# Patient Record
Sex: FEMALE | Race: WHITE | NOT HISPANIC OR LATINO | Employment: OTHER | RURAL
[De-identification: names, ages, dates, MRNs, and addresses within clinical notes are randomized per-mention and may not be internally consistent; named-entity substitution may affect disease eponyms.]

---

## 2017-09-14 ENCOUNTER — CONVERSION ENCOUNTER (OUTPATIENT)
Dept: FAMILY MEDICINE CLINIC | Facility: CLINIC | Age: 52
End: 2017-09-14

## 2017-09-14 ENCOUNTER — HOSPITAL ENCOUNTER (OUTPATIENT)
Dept: OTHER | Facility: HOSPITAL | Age: 52
Discharge: HOME | End: 2017-09-14
Attending: FAMILY MEDICINE | Admitting: FAMILY MEDICINE

## 2017-10-16 ENCOUNTER — CONVERSION ENCOUNTER (OUTPATIENT)
Dept: FAMILY MEDICINE CLINIC | Facility: CLINIC | Age: 52
End: 2017-10-16

## 2018-09-17 ENCOUNTER — CONVERSION ENCOUNTER (OUTPATIENT)
Dept: FAMILY MEDICINE CLINIC | Facility: CLINIC | Age: 53
End: 2018-09-17

## 2019-06-04 VITALS
RESPIRATION RATE: 16 BRPM | OXYGEN SATURATION: 93 % | BODY MASS INDEX: 37.28 KG/M2 | HEART RATE: 90 BPM | SYSTOLIC BLOOD PRESSURE: 107 MMHG | WEIGHT: 210 LBS | BODY MASS INDEX: 37.21 KG/M2 | RESPIRATION RATE: 16 BRPM | DIASTOLIC BLOOD PRESSURE: 72 MMHG | OXYGEN SATURATION: 96 % | HEART RATE: 86 BPM | SYSTOLIC BLOOD PRESSURE: 133 MMHG | DIASTOLIC BLOOD PRESSURE: 81 MMHG | WEIGHT: 210.38 LBS | HEIGHT: 63 IN | HEIGHT: 63 IN

## 2019-09-13 PROBLEM — E78.5 HYPERLIPIDEMIA: Status: ACTIVE | Noted: 2019-09-13

## 2019-09-13 PROBLEM — D72.829 ELEVATED WHITE BLOOD CELL COUNT: Status: ACTIVE | Noted: 2017-12-14

## 2019-09-13 PROBLEM — K58.9 IRRITABLE BOWEL SYNDROME: Status: ACTIVE | Noted: 2019-09-13

## 2019-09-13 PROBLEM — K21.9 GASTROESOPHAGEAL REFLUX DISEASE: Status: ACTIVE | Noted: 2019-09-13

## 2019-09-13 RX ORDER — HYDROCHLOROTHIAZIDE 25 MG/1
TABLET ORAL EVERY 24 HOURS
COMMUNITY
Start: 2018-08-10 | End: 2019-09-19 | Stop reason: SDUPTHER

## 2019-09-13 RX ORDER — SIMVASTATIN 20 MG
TABLET ORAL
COMMUNITY
Start: 2018-07-06 | End: 2019-09-19 | Stop reason: SDUPTHER

## 2019-09-13 RX ORDER — OLANZAPINE 10 MG/1
TABLET ORAL
COMMUNITY
Start: 2014-08-22

## 2019-09-13 RX ORDER — PROPRANOLOL HYDROCHLORIDE 20 MG/1
TABLET ORAL
COMMUNITY
Start: 2014-08-22

## 2019-09-19 ENCOUNTER — OFFICE VISIT (OUTPATIENT)
Dept: FAMILY MEDICINE CLINIC | Facility: CLINIC | Age: 54
End: 2019-09-19

## 2019-09-19 VITALS
HEIGHT: 63 IN | DIASTOLIC BLOOD PRESSURE: 76 MMHG | SYSTOLIC BLOOD PRESSURE: 117 MMHG | OXYGEN SATURATION: 94 % | RESPIRATION RATE: 18 BRPM | WEIGHT: 189.8 LBS | HEART RATE: 90 BPM | TEMPERATURE: 97.6 F | BODY MASS INDEX: 33.63 KG/M2

## 2019-09-19 DIAGNOSIS — K21.9 GASTROESOPHAGEAL REFLUX DISEASE WITHOUT ESOPHAGITIS: ICD-10-CM

## 2019-09-19 DIAGNOSIS — F20.0 PARANOID SCHIZOPHRENIA (HCC): ICD-10-CM

## 2019-09-19 DIAGNOSIS — Z00.00 ENCOUNTER FOR MEDICARE ANNUAL WELLNESS EXAM: ICD-10-CM

## 2019-09-19 DIAGNOSIS — R60.0 LOCALIZED EDEMA: ICD-10-CM

## 2019-09-19 DIAGNOSIS — K52.9 COLITIS: ICD-10-CM

## 2019-09-19 DIAGNOSIS — J43.2 CENTRILOBULAR EMPHYSEMA (HCC): ICD-10-CM

## 2019-09-19 DIAGNOSIS — Z23 NEED FOR IMMUNIZATION AGAINST INFLUENZA: ICD-10-CM

## 2019-09-19 DIAGNOSIS — E78.01 FAMILIAL HYPERCHOLESTEROLEMIA: Primary | ICD-10-CM

## 2019-09-19 DIAGNOSIS — N94.6 DYSMENORRHEA: ICD-10-CM

## 2019-09-19 DIAGNOSIS — K58.2 IRRITABLE BOWEL SYNDROME WITH BOTH CONSTIPATION AND DIARRHEA: ICD-10-CM

## 2019-09-19 PROCEDURE — 90674 CCIIV4 VAC NO PRSV 0.5 ML IM: CPT | Performed by: FAMILY MEDICINE

## 2019-09-19 PROCEDURE — G0439 PPPS, SUBSEQ VISIT: HCPCS | Performed by: FAMILY MEDICINE

## 2019-09-19 PROCEDURE — G0008 ADMIN INFLUENZA VIRUS VAC: HCPCS | Performed by: FAMILY MEDICINE

## 2019-09-19 PROCEDURE — 99406 BEHAV CHNG SMOKING 3-10 MIN: CPT | Performed by: FAMILY MEDICINE

## 2019-09-19 PROCEDURE — 99214 OFFICE O/P EST MOD 30 MIN: CPT | Performed by: FAMILY MEDICINE

## 2019-09-19 RX ORDER — HYDROCHLOROTHIAZIDE 25 MG/1
25 TABLET ORAL EVERY 24 HOURS
Qty: 90 TABLET | Refills: 3 | Status: SHIPPED | OUTPATIENT
Start: 2019-09-19 | End: 2020-09-09

## 2019-09-19 RX ORDER — SIMVASTATIN 20 MG
20 TABLET ORAL NIGHTLY
Qty: 90 TABLET | Refills: 3 | Status: SHIPPED | OUTPATIENT
Start: 2019-09-19 | End: 2020-09-09

## 2019-09-19 NOTE — PROGRESS NOTES
Judy Sosa presents for Annual Wellness Visit as well as stable chronic medical conditions as listed.    HPI  Past Medical History:   Diagnosis Date   • Amenorrhea    • Colitis    • COPD (chronic obstructive pulmonary disease) (CMS/HCC)    • Dysmenorrhea    • Edema    • Elevated white blood cell count    • GERD (gastroesophageal reflux disease)    • Hyperlipidemia    • IBS (irritable bowel syndrome)    • Palpitation    • Paranoid schizophrenia (CMS/HCC)      Past Surgical History:   Procedure Laterality Date   • APPENDECTOMY       Family History   Problem Relation Age of Onset   • Lung cancer Father    • Diabetes Father    • Diabetes Paternal Grandfather      Social History     Tobacco Use   • Smoking status: Current Every Day Smoker   • Smokeless tobacco: Never Used   Substance Use Topics   • Alcohol use: No     Frequency: Never       No visits with results within 7 Day(s) from this visit.   Latest known visit with results is:   Conversion Encounter on 09/09/2016   Component Date Value Ref Range Status   • Albumin 09/09/2016 4.4  3.6 - 5.1 g/dL Final   • Alkaline Phosphatase 09/09/2016 94  33 - 130 units/L Final   • Basophils Absolute 09/09/2016 36 CELLS/UL  0 - 200 10*3/mm3 Final   • Basophil Rel % 09/09/2016 0.3  % Final   • Total Bilirubin 09/09/2016 0.5  0.2 - 1.2 mg/dL Final   • BUN 09/09/2016 11  7 - 25 mg/dL Final   • BUN/Creatinine Ratio 09/09/2016 NOT APPLICABLE (calc)  6 - 22 Final   • Calcium 09/09/2016 9.3  8.6 - 10.4 mg/dL Final   • Chloride 09/09/2016 106  98 - 110 mmol/L Final   • Chol/HDL Ratio 09/09/2016 3.2 (calc)  < OR = 5.0 Final   • Total Cholesterol 09/09/2016 120* 125 - 200 mg/dL Final   • CO2 CONTENT  09/09/2016 25  20 - 31 mmol/L Final   • Creatinine 09/09/2016 0.87  0.50 - 1.05 mg/dL Final   • eGFR African Am 09/09/2016 77  > OR = 60 mL/min/1.73m2 Final   • eGFR Non  Am 09/09/2016 89  > OR = 60 mL/min/1.73m2 Final   • Eosinophils Absolute 09/09/2016 252 CELLS/UL  15 - 500  10*3/mm3 Final   • Eosinophil Rel % 09/09/2016 2.1  % Final   • Globulin 09/09/2016 2.8 G/DL (CALC)  1.9 - 3.7 g/dL Final   • Glucose 09/09/2016 89  65 - 99 mg/dL Final   • Hematocrit 09/09/2016 49.9* 35.0 - 45.0 % Final   • HDL Cholesterol 09/09/2016 37* > OR = 46 mg/dL Final   • Hemoglobin 09/09/2016 16.4* 11.7 - 15.5 g/dL Final   • LDL Cholesterol  09/09/2016 64 MG/DL (CALC)  <130 mg/dL Final   • Lymphocytes Absolute 09/09/2016 3300 CELLS/UL  850 - 3900 10*3/mm3 Final   • Lymphocyte Rel % 09/09/2016 27.5  % Final   • MCH 09/09/2016 31.7  27.0 - 33.0 pg Final   • MCHC 09/09/2016 32.9 G/DL  32.0 - 36.0 % Final   • MCV 09/09/2016 96.5  80.0 - 100.0 fL Final   • Monocyte Rel % 09/09/2016 4.6  % Final   • Monocytes Absolute 09/09/2016 552 CELLS/UL  200 - 950 10*3/microliter Final   • MPV 09/09/2016 7.3* 7.5 - 11.5 fL Final   • Neutrophils Absolute 09/09/2016 7860 CELLS/UL* 1500 - 7800 10*3/mm3 Final   • Platelets 09/09/2016 327 THOUSAND/UL  140 - 400 10*3/mm3 Final   • Neutrophils, Fluid 09/09/2016 65.5  % Final   • Potassium 09/09/2016 3.5  3.5 - 5.3 mmol/L Final   • Total Protein 09/09/2016 7.2  6.1 - 8.1 g/dL Final   • RBC 09/09/2016 5.17 MILLION/UL* 3.80 - 5.10 10*6/mm3 Final   • RDW 09/09/2016 14.1  11.0 - 15.0 % Final   • AST (SGOT) 09/09/2016 19  10 - 35 units/L Final   • ALT (SGPT) 09/09/2016 24  6 - 29 units/L Final   • Sodium 09/09/2016 141  135 - 146 mmol/L Final   • Triglycerides 09/09/2016 94  <150 mg/dL Final   • WBC 09/09/2016 12.0 THOUSAND/UL* 3.8 - 10.8 K/uL Final   • A/G Ratio 09/09/2016 1.6 (calc)  1.0 - 2.5 Final   • Albumin 09/15/2017 4.1  3.6 - 5.1 g/dL Final   • Alkaline Phosphatase 09/15/2017 88  33 - 130 units/L Final   • Basophils Absolute 09/15/2017 35 CELLS/UL  0 - 200 10*3/mm3 Final   • Basophil Rel % 09/15/2017 0.3  % Final   • Total Bilirubin 09/15/2017 0.4  0.2 - 1.2 mg/dL Final   • BUN 09/15/2017 11  7 - 25 mg/dL Final   • BUN/Creatinine Ratio 09/15/2017 NOT APPLICABLE (calc)  6 - 22  Final   • Calcium 09/15/2017 9.0  8.6 - 10.4 mg/dL Final   • Chloride 09/15/2017 106  98 - 110 mmol/L Final   • Chol/HDL Ratio 09/15/2017 3.9 (calc)  <5.0 Final   • Total Cholesterol 09/15/2017 126  <200 mg/dL Final   • CO2 CONTENT  09/15/2017 25  20 - 31 mmol/L Final   • Creatinine 09/15/2017 0.75  0.50 - 1.05 mg/dL Final   • eGFR African Am 09/15/2017 91  > OR = 60 mL/min/1.73m2 Final   • eGFR Non African Am 09/15/2017 105  > OR = 60 mL/min/1.73m2 Final   • Eosinophils Absolute 09/15/2017 255 CELLS/UL  15 - 500 10*3/mm3 Final   • Eosinophil Rel % 09/15/2017 2.2  % Final   • Globulin 09/15/2017 2.5 G/DL (CALC)  1.9 - 3.7 g/dL Final   • Glucose 09/15/2017 84  65 - 99 mg/dL Final   • Hematocrit 09/15/2017 43.8  35.0 - 45.0 % Final   • HDL Cholesterol 09/15/2017 32* >50 mg/dL Final   • Hemoglobin 09/15/2017 15.1  11.7 - 15.5 g/dL Final   • LDL Cholesterol  09/15/2017 71 MG/DL (CALC)  mg/dL Final   • Lymphocytes Absolute 09/15/2017 3399 CELLS/UL  850 - 3900 10*3/mm3 Final   • Lymphocyte Rel % 09/15/2017 29.3  % Final   • MCH 09/15/2017 31.9  27.0 - 33.0 pg Final   • MCHC 09/15/2017 34.5 G/DL  32.0 - 36.0 % Final   • MCV 09/15/2017 92.4  80.0 - 100.0 fL Final   • Monocyte Rel % 09/15/2017 5.6  % Final   • Monocytes Absolute 09/15/2017 650 CELLS/UL  200 - 950 10*3/microliter Final   • MPV 09/15/2017 9.0  7.5 - 12.5 fL Final   • Neutrophils Absolute 09/15/2017 7262 CELLS/UL  1500 - 7800 10*3/mm3 Final   • Platelets 09/15/2017 312 THOUSAND/UL  140 - 400 10*3/mm3 Final   • Neutrophils, Fluid 09/15/2017 62.6  % Final   • Potassium 09/15/2017 3.6  3.5 - 5.3 mmol/L Final   • Total Protein 09/15/2017 6.6  6.1 - 8.1 g/dL Final   • RBC 09/15/2017 4.74 MILLION/UL  3.80 - 5.10 10*6/mm3 Final   • RDW 09/15/2017 12.7  11.0 - 15.0 % Final   • AST (SGOT) 09/15/2017 16  10 - 35 units/L Final   • ALT (SGPT) 09/15/2017 20  6 - 29 units/L Final   • Sodium 09/15/2017 142  135 - 146 mmol/L Final   • Triglycerides 09/15/2017 146  <150 mg/dL  Final   • WBC 09/15/2017 11.6 THOUSAND/UL* 3.8 - 10.8 K/uL Final   • A/G Ratio 09/15/2017 1.6 (calc)  1.0 - 2.5 Final   • Albumin 09/17/2018 4.2  3.6 - 5.1 g/dL Final   • Alkaline Phosphatase 09/17/2018 100  33 - 130 units/L Final   • Basophils Absolute 09/17/2018 57 CELLS/UL  0 - 200 10*3/mm3 Final   • Basophil Rel % 09/17/2018 0.4  % Final   • Total Bilirubin 09/17/2018 0.4  0.2 - 1.2 mg/dL Final   • BUN 09/17/2018 9  7 - 25 mg/dL Final   • BUN/Creatinine Ratio 09/17/2018 NOT APPLICABLE (calc)  6 - 22 Final   • Calcium 09/17/2018 9.1  8.6 - 10.4 mg/dL Final   • Chloride 09/17/2018 107  98 - 110 mmol/L Final   • Chol/HDL Ratio 09/17/2018 3.4 (calc)  <5.0 Final   • Total Cholesterol 09/17/2018 130  <200 mg/dL Final   • CO2 CONTENT  09/17/2018 27  20 - 32 mmol/L Final   • Creatinine 09/17/2018 0.79  0.50 - 1.05 mg/dL Final   • eGFR African Am 09/17/2018 85  > OR = 60 mL/min/1.73m2 Final   • eGFR Non  Am 09/17/2018 98  > OR = 60 mL/min/1.73m2 Final   • Eosinophils Absolute 09/17/2018 286 CELLS/UL  15 - 500 10*3/mm3 Final   • Eosinophil Rel % 09/17/2018 2.0  % Final   • Globulin 09/17/2018 3.0 G/DL (CALC)  1.9 - 3.7 g/dL Final   • Glucose 09/17/2018 92  65 - 99 mg/dL Final   • Hematocrit 09/17/2018 46.5* 35.0 - 45.0 % Final   • HDL Cholesterol 09/17/2018 38* >50 mg/dL Final   • Hemoglobin 09/17/2018 15.5  11.7 - 15.5 g/dL Final   • LDL Cholesterol  09/17/2018 71 MG/DL (CALC)  mg/dL Final   • Lymphocytes Absolute 09/17/2018 3461 CELLS/UL  850 - 3900 10*3/mm3 Final   • Lymphocyte Rel % 09/17/2018 24.2  % Final   • MCH 09/17/2018 30.8  27.0 - 33.0 pg Final   • MCHC 09/17/2018 33.3 G/DL  32.0 - 36.0 % Final   • MCV 09/17/2018 92.4  80.0 - 100.0 fL Final   • Monocyte Rel % 09/17/2018 4.0  % Final   • Monocytes Absolute 09/17/2018 572 CELLS/UL  200 - 950 10*3/microliter Final   • MPV 09/17/2018 9.1  7.5 - 12.5 fL Final   • Neutrophils Absolute 09/17/2018 9924 CELLS/UL* 1500 - 7800 10*3/mm3 Final   • Platelets  09/17/2018 316 THOUSAND/UL  140 - 400 10*3/mm3 Final   • Neutrophils, Fluid 09/17/2018 69.4  % Final   • Potassium 09/17/2018 3.4* 3.5 - 5.3 mmol/L Final   • Total Protein 09/17/2018 7.2  6.1 - 8.1 g/dL Final   • RBC 09/17/2018 5.03 MILLION/UL  3.80 - 5.10 10*6/mm3 Final   • RDW 09/17/2018 13.0  11.0 - 15.0 % Final   • AST (SGOT) 09/17/2018 16  10 - 35 units/L Final   • ALT (SGPT) 09/17/2018 20  6 - 29 units/L Final   • Sodium 09/17/2018 142  135 - 146 mmol/L Final   • Triglycerides 09/17/2018 127  <150 mg/dL Final   • WBC 09/17/2018 14.3 THOUSAND/UL* 3.8 - 10.8 K/uL Final   • A/G Ratio 09/17/2018 1.4 (calc)  1.0 - 2.5 Final   • Hematocrit 12/20/2018 45.7* 35.0 - 45.0 % Final   • Hemoglobin 12/20/2018 15.1  11.7 - 15.5 g/dL Final   • MCH 12/20/2018 30.8  27.0 - 33.0 pg Final   • MCHC 12/20/2018 33.0 G/DL  32.0 - 36.0 % Final   • MCV 12/20/2018 93.3  80.0 - 100.0 fL Final   • MPV 12/20/2018 9.2  7.5 - 12.5 fL Final   • Platelets 12/20/2018 335 THOUSAND/UL  140 - 400 10*3/mm3 Final   • RBC 12/20/2018 4.90 MILLION/UL  3.80 - 5.10 10*6/mm3 Final   • RDW 12/20/2018 12.9  11.0 - 15.0 % Final   • WBC 12/20/2018 12.4 THOUSAND/UL* 3.8 - 10.8 K/uL Final         Diagnoses and all orders for this visit:    1. Familial hypercholesterolemia (Primary)  Assessment & Plan:  Recheck blood work and notify of results.  Encouraged low-fat, low-cholesterol diet. Discussed timing of lipid monitoring, need for liver monitoring while on meds. Risks of lack of control discussed.     Orders:  -     simvastatin (ZOCOR) 20 MG tablet; Take 1 tablet by mouth Every Night.  Dispense: 90 tablet; Refill: 3  -     Comprehensive Metabolic Panel  -     Lipid Panel    2. Encounter for Medicare annual wellness exam  Assessment & Plan:  Discussed injury prevention, diet and exercise, safe sexual practices, and screening for common diseases. Encouraged use of sunscreen and seatbelts. Encouraged SBE, avoidance of tobacco, limiting alcohol, and yearly dental  and eye exams.  She declines mammogram, PAP, HPV testing.       3. Centrilobular emphysema (CMS/HCC)  Assessment & Plan:  Minimal symptoms. Continues to smoke. Judy Sosa is a current cigarettes user.  She currently smokes 2 pack of cigarettes and packs of cigarettes per day for a duration of 40 years. I have educated her on the risk of diseases from using tobacco products such as cancer, COPD and heart diease.     I advised her to quit and she is not willing to quit.    I spent 4 minutes counseling the patient.                4. Irritable bowel syndrome with both constipation and diarrhea  Assessment & Plan:  Stable with occasional flares with dietary indiscretions.       5. Gastroesophageal reflux disease without esophagitis  Assessment & Plan:  Limit tobacco, alcohol, caffeine, chocalate, citrus fruits, recumbency after meals and large portions.       6. Colitis    7. Dysmenorrhea    8. Paranoid schizophrenia (CMS/HCC)  Assessment & Plan:  Continue psychiatry management.       9. Localized edema  -     hydrochlorothiazide (HYDRODIURIL) 25 MG tablet; Take 1 tablet by mouth Daily.  Dispense: 90 tablet; Refill: 3  -     CBC Auto Differential    10. Need for immunization against influenza  -     Cancel: Flucelvax Quad=>4Years (Vial); Standing  -     Cancel: Flucelvax Quad=>4Years (Vial)  -     Flucelvax Quad=>4Years (PFS)        Chief Complaint   Patient presents with   • Medicare Wellness-subsequent       Subjective   History of Present Illness:  Judy Sosa is a 54 y.o. female who presents for a Subsequent Medicare Wellness Visit.    HEALTH RISK ASSESSMENT    Recent Hospitalizations:  No hospitalization(s) within the last year.    Current Medical Providers:  Patient Care Team:  Lyell, Reggie Duane, MD as PCP - General  Franck Haq as PCP - Claims Attributed    Smoking Status:  Social History     Tobacco Use   Smoking Status Current Every Day Smoker   Smokeless Tobacco Never Used       Alcohol  Consumption:  Social History     Substance and Sexual Activity   Alcohol Use No   • Frequency: Never       Depression Screen:   PHQ-2/PHQ-9 Depression Screening 9/19/2019   Little interest or pleasure in doing things 1   Feeling down, depressed, or hopeless 0   Total Score 1       Fall Risk Screen:  ALFREDO Fall Risk Assessment has not been completed.    Health Habits and Functional and Cognitive Screening:  Functional & Cognitive Status 9/19/2019   Do you have difficulty preparing food and eating? No   Do you have difficulty bathing yourself, getting dressed or grooming yourself? No   Do you have difficulty using the toilet? No   Do you have difficulty moving around from place to place? No   Do you have trouble with steps or getting out of a bed or a chair? No   Current Diet Well Balanced Diet   Dental Exam Up to date   Eye Exam Up to date   Exercise (times per week) 0 times per week   Current Exercise Activities Include None   Do you need help using the phone?  No   Are you deaf or do you have serious difficulty hearing?  No   Do you need help with transportation? No   Do you need help shopping? No   Do you need help preparing meals?  No   Do you need help with housework?  No   Do you need help with laundry? No   Do you need help taking your medications? No   Do you need help managing money? No   Do you ever drive or ride in a car without wearing a seat belt? No   Have you felt unusual stress, anger or loneliness in the last month? No   Who do you live with? Alone   If you need help, do you have trouble finding someone available to you? No   Have you been bothered in the last four weeks by sexual problems? No   Do you have difficulty concentrating, remembering or making decisions? No         Does the patient have evidence of cognitive impairment? No    Asprin use counseling:Does not need ASA (and currently is not on it)    Age-appropriate Screening Schedule:  Refer to the list below for future screening  recommendations based on patient's age, sex and/or medical conditions. Orders for these recommended tests are listed in the plan section. The patient has been provided with a written plan.    Health Maintenance   Topic Date Due   • TDAP/TD VACCINES (1 - Tdap) 08/15/1983   • ZOSTER VACCINE (1 of 2) 08/15/2014   • INFLUENZA VACCINE  08/01/2019   • PAP SMEAR  09/13/2019   • COLONOSCOPY  09/13/2019   • MAMMOGRAM  09/14/2019   • LIPID PANEL  09/17/2019          The following portions of the patient's history were reviewed and updated as appropriate: allergies, current medications, past family history, past medical history, past social history, past surgical history and problem list.    Outpatient Medications Prior to Visit   Medication Sig Dispense Refill   • OLANZapine (ZYPREXA) 10 MG tablet ZYPREXA 10 MG TABS     • propranolol (INDERAL) 20 MG tablet PROPRANOLOL HCL 20 MG TABS     • topiramate (TOPAMAX) 200 MG tablet TOPAMAX 200 MG TABS     • hydrochlorothiazide (HYDRODIURIL) 25 MG tablet Daily.     • simvastatin (ZOCOR) 20 MG tablet SIMVASTATIN 20 MG TABS       No facility-administered medications prior to visit.        Patient Active Problem List   Diagnosis   • Amenorrhea   • Chronic obstructive pulmonary disease (CMS/HCC)   • Colitis   • Dysmenorrhea   • Edema   • Elevated white blood cell count   • Gastroesophageal reflux disease   • Hyperlipidemia   • Irritable bowel syndrome   • Paranoid schizophrenia (CMS/HCC)   • Encounter for Medicare annual wellness exam       Advanced Care Planning:  Patient does not have an advance directive - information provided to the patient today    Review of Systems    Compared to one year ago, the patient feels her physical health is the same.  Compared to one year ago, the patient feels her mental health is the same.    Reviewed chart for potential of high risk medication in the elderly: yes  Reviewed chart for potential of harmful drug interactions in the elderly:yes    Objective   "       Vitals:    09/19/19 1256   BP: 117/76   BP Location: Left arm   Cuff Size: Large Adult   Pulse: 90   Resp: 18   Temp: 97.6 °F (36.4 °C)   TempSrc: Oral   SpO2: 94%   Weight: 86.1 kg (189 lb 12.8 oz)   Height: 160 cm (63\")       Body mass index is 33.62 kg/m².  Discussed the patient's BMI with her. The BMI is above average; BMI management plan is completed.    Physical Exam          Assessment/Plan   Medicare Risks and Personalized Health Plan  CMS Preventative Services Quick Reference  Advance Directive Discussion  Obesity/Overweight     The above risks/problems have been discussed with the patient.  Pertinent information has been shared with the patient in the After Visit Summary.  Follow up plans and orders are seen below in the Assessment/Plan Section.    Diagnoses and all orders for this visit:    1. Familial hypercholesterolemia (Primary)  Assessment & Plan:  Recheck blood work and notify of results.  Encouraged low-fat, low-cholesterol diet. Discussed timing of lipid monitoring, need for liver monitoring while on meds. Risks of lack of control discussed.     Orders:  -     simvastatin (ZOCOR) 20 MG tablet; Take 1 tablet by mouth Every Night.  Dispense: 90 tablet; Refill: 3  -     Comprehensive Metabolic Panel  -     Lipid Panel    2. Centrilobular emphysema (CMS/HCC)  Assessment & Plan:  Minimal symptoms. Continues to smoke. Judy Sosa is a current cigarettes user.  She currently smokes 2 pack of cigarettes and packs of cigarettes per day for a duration of 40 years. I have educated her on the risk of diseases from using tobacco products such as cancer, COPD and heart diease.     I advised her to quit and she is not willing to quit.    I spent 4 minutes counseling the patient.                3. Irritable bowel syndrome with both constipation and diarrhea  Assessment & Plan:  Stable with occasional flares with dietary indiscretions.       4. Gastroesophageal reflux disease without " esophagitis  Assessment & Plan:  Limit tobacco, alcohol, caffeine, chocalate, citrus fruits, recumbency after meals and large portions.       5. Colitis    6. Dysmenorrhea    7. Paranoid schizophrenia (CMS/[facility])  Assessment & Plan:  Continue psychiatry management.       8. Localized edema  -     hydrochlorothiazide (HYDRODIURIL) 25 MG tablet; Take 1 tablet by mouth Daily.  Dispense: 90 tablet; Refill: 3  -     CBC Auto Differential    9. Need for immunization against influenza  -     Flucelvax Quad=>4Years (Vial); Standing  -     Flucelvax Quad=>4Years (Vial)    Follow Up:  Return in about 1 year (around 9/19/2020).     An After Visit Summary and PPPS were given to the patient.

## 2019-09-19 NOTE — ASSESSMENT & PLAN NOTE
Recheck blood work and notify of results.  Encouraged low-fat, low-cholesterol diet. Discussed timing of lipid monitoring, need for liver monitoring while on meds. Risks of lack of control discussed.

## 2019-09-19 NOTE — ASSESSMENT & PLAN NOTE
Minimal symptoms. Continues to smoke. Judy Sosa is a current cigarettes user.  She currently smokes 2 pack of cigarettes and packs of cigarettes per day for a duration of 40 years. I have educated her on the risk of diseases from using tobacco products such as cancer, COPD and heart diease.     I advised her to quit and she is not willing to quit.    I spent 4 minutes counseling the patient.

## 2019-09-19 NOTE — ASSESSMENT & PLAN NOTE
Discussed injury prevention, diet and exercise, safe sexual practices, and screening for common diseases. Encouraged use of sunscreen and seatbelts. Encouraged SBE, avoidance of tobacco, limiting alcohol, and yearly dental and eye exams.  She declines mammogram, PAP, HPV testing.

## 2019-09-19 NOTE — ASSESSMENT & PLAN NOTE
Limit tobacco, alcohol, caffeine, chocalate, citrus fruits, recumbency after meals and large portions.

## 2019-09-20 LAB
ALBUMIN SERPL-MCNC: 4.5 G/DL (ref 3.5–5.5)
ALBUMIN/GLOB SERPL: 1.6 {RATIO} (ref 1.2–2.2)
ALP SERPL-CCNC: 109 IU/L (ref 39–117)
ALT SERPL-CCNC: 28 IU/L (ref 0–32)
AST SERPL-CCNC: 21 IU/L (ref 0–40)
BASOPHILS # BLD AUTO: 0 X10E3/UL (ref 0–0.2)
BASOPHILS NFR BLD AUTO: 0 %
BILIRUB SERPL-MCNC: 0.3 MG/DL (ref 0–1.2)
BUN SERPL-MCNC: 8 MG/DL (ref 6–24)
BUN/CREAT SERPL: 9 (ref 9–23)
CALCIUM SERPL-MCNC: 8.7 MG/DL (ref 8.7–10.2)
CHLORIDE SERPL-SCNC: 99 MMOL/L (ref 96–106)
CHOLEST SERPL-MCNC: 135 MG/DL (ref 100–199)
CO2 SERPL-SCNC: 22 MMOL/L (ref 20–29)
CREAT SERPL-MCNC: 0.93 MG/DL (ref 0.57–1)
EOSINOPHIL # BLD AUTO: 0.3 X10E3/UL (ref 0–0.4)
EOSINOPHIL NFR BLD AUTO: 2 %
ERYTHROCYTE [DISTWIDTH] IN BLOOD BY AUTOMATED COUNT: 13.6 % (ref 12.3–15.4)
GLOBULIN SER CALC-MCNC: 2.8 G/DL (ref 1.5–4.5)
GLUCOSE SERPL-MCNC: 117 MG/DL (ref 65–99)
HCT VFR BLD AUTO: 46.8 % (ref 34–46.6)
HDLC SERPL-MCNC: 29 MG/DL
HGB BLD-MCNC: 16.2 G/DL (ref 11.1–15.9)
IMM GRANULOCYTES # BLD AUTO: 0 X10E3/UL (ref 0–0.1)
IMM GRANULOCYTES NFR BLD AUTO: 0 %
LDLC SERPL CALC-MCNC: 69 MG/DL (ref 0–99)
LYMPHOCYTES # BLD AUTO: 3.5 X10E3/UL (ref 0.7–3.1)
LYMPHOCYTES NFR BLD AUTO: 28 %
MCH RBC QN AUTO: 31.8 PG (ref 26.6–33)
MCHC RBC AUTO-ENTMCNC: 34.6 G/DL (ref 31.5–35.7)
MCV RBC AUTO: 92 FL (ref 79–97)
MONOCYTES # BLD AUTO: 0.6 X10E3/UL (ref 0.1–0.9)
MONOCYTES NFR BLD AUTO: 5 %
NEUTROPHILS # BLD AUTO: 7.9 X10E3/UL (ref 1.4–7)
NEUTROPHILS NFR BLD AUTO: 65 %
PLATELET # BLD AUTO: 295 X10E3/UL (ref 150–450)
POTASSIUM SERPL-SCNC: 2.8 MMOL/L (ref 3.5–5.2)
PROT SERPL-MCNC: 7.3 G/DL (ref 6–8.5)
RBC # BLD AUTO: 5.09 X10E6/UL (ref 3.77–5.28)
SODIUM SERPL-SCNC: 141 MMOL/L (ref 134–144)
TRIGL SERPL-MCNC: 184 MG/DL (ref 0–149)
VLDLC SERPL CALC-MCNC: 37 MG/DL (ref 5–40)
WBC # BLD AUTO: 12.3 X10E3/UL (ref 3.4–10.8)

## 2019-09-24 ENCOUNTER — TELEPHONE (OUTPATIENT)
Dept: FAMILY MEDICINE CLINIC | Facility: CLINIC | Age: 54
End: 2019-09-24

## 2019-09-24 RX ORDER — POTASSIUM CHLORIDE 1500 MG/1
20 TABLET, FILM COATED, EXTENDED RELEASE ORAL DAILY
Qty: 90 TABLET | Refills: 2 | Status: SHIPPED | OUTPATIENT
Start: 2019-09-24 | End: 2019-10-28 | Stop reason: SDUPTHER

## 2019-09-24 RX ORDER — POTASSIUM CHLORIDE 1500 MG/1
20 TABLET, FILM COATED, EXTENDED RELEASE ORAL DAILY
COMMUNITY
End: 2019-09-24 | Stop reason: SDUPTHER

## 2019-10-23 ENCOUNTER — TELEPHONE (OUTPATIENT)
Dept: FAMILY MEDICINE CLINIC | Facility: CLINIC | Age: 54
End: 2019-10-23

## 2019-10-23 DIAGNOSIS — E87.6 HYPOKALEMIA: Primary | ICD-10-CM

## 2019-10-23 NOTE — TELEPHONE ENCOUNTER
----- Message from Tasha Reyes LPN sent at 9/24/2019 11:42 AM EDT -----  Repeat cmp for low potassium

## 2019-10-26 LAB
ALBUMIN SERPL-MCNC: 4.4 G/DL (ref 3.5–5.5)
ALBUMIN/GLOB SERPL: 1.5 {RATIO} (ref 1.2–2.2)
ALP SERPL-CCNC: 110 IU/L (ref 39–117)
ALT SERPL-CCNC: 24 IU/L (ref 0–32)
AST SERPL-CCNC: 22 IU/L (ref 0–40)
BILIRUB SERPL-MCNC: 0.3 MG/DL (ref 0–1.2)
BUN SERPL-MCNC: 10 MG/DL (ref 6–24)
BUN/CREAT SERPL: 12 (ref 9–23)
CALCIUM SERPL-MCNC: 8.8 MG/DL (ref 8.7–10.2)
CHLORIDE SERPL-SCNC: 97 MMOL/L (ref 96–106)
CO2 SERPL-SCNC: 25 MMOL/L (ref 20–29)
CREAT SERPL-MCNC: 0.86 MG/DL (ref 0.57–1)
GLOBULIN SER CALC-MCNC: 2.9 G/DL (ref 1.5–4.5)
GLUCOSE SERPL-MCNC: 122 MG/DL (ref 65–99)
POTASSIUM SERPL-SCNC: 3.3 MMOL/L (ref 3.5–5.2)
PROT SERPL-MCNC: 7.3 G/DL (ref 6–8.5)
SODIUM SERPL-SCNC: 141 MMOL/L (ref 134–144)

## 2019-10-28 ENCOUNTER — RESULTS ENCOUNTER (OUTPATIENT)
Dept: FAMILY MEDICINE CLINIC | Facility: CLINIC | Age: 54
End: 2019-10-28

## 2019-10-28 DIAGNOSIS — E87.6 HYPOKALEMIA: ICD-10-CM

## 2019-10-28 RX ORDER — POTASSIUM CHLORIDE 1500 MG/1
20 TABLET, FILM COATED, EXTENDED RELEASE ORAL DAILY
COMMUNITY
End: 2019-10-28 | Stop reason: SDUPTHER

## 2019-10-29 RX ORDER — POTASSIUM CHLORIDE 1500 MG/1
20 TABLET, FILM COATED, EXTENDED RELEASE ORAL 2 TIMES DAILY
Qty: 180 TABLET | Refills: 0 | Status: SHIPPED | OUTPATIENT
Start: 2019-10-29 | End: 2020-01-27

## 2019-12-11 ENCOUNTER — TELEPHONE (OUTPATIENT)
Dept: FAMILY MEDICINE CLINIC | Facility: CLINIC | Age: 54
End: 2019-12-11

## 2019-12-11 DIAGNOSIS — E87.6 LOW SERUM POTASSIUM: Primary | ICD-10-CM

## 2019-12-11 NOTE — TELEPHONE ENCOUNTER
----- Message from Tasha Reyes LPN sent at 10/28/2019  9:41 AM EDT -----  Repeat cmp for low potassium

## 2019-12-13 LAB
ALBUMIN SERPL-MCNC: 4.5 G/DL (ref 3.5–5.5)
ALBUMIN/GLOB SERPL: 1.5 {RATIO} (ref 1.2–2.2)
ALP SERPL-CCNC: 113 IU/L (ref 39–117)
ALT SERPL-CCNC: 22 IU/L (ref 0–32)
AST SERPL-CCNC: 16 IU/L (ref 0–40)
BILIRUB SERPL-MCNC: 0.2 MG/DL (ref 0–1.2)
BUN SERPL-MCNC: 15 MG/DL (ref 6–24)
BUN/CREAT SERPL: 16 (ref 9–23)
CALCIUM SERPL-MCNC: 9.1 MG/DL (ref 8.7–10.2)
CHLORIDE SERPL-SCNC: 106 MMOL/L (ref 96–106)
CO2 SERPL-SCNC: 24 MMOL/L (ref 20–29)
CREAT SERPL-MCNC: 0.93 MG/DL (ref 0.57–1)
GLOBULIN SER CALC-MCNC: 3 G/DL (ref 1.5–4.5)
GLUCOSE SERPL-MCNC: 109 MG/DL (ref 65–99)
POTASSIUM SERPL-SCNC: 4 MMOL/L (ref 3.5–5.2)
PROT SERPL-MCNC: 7.5 G/DL (ref 6–8.5)
SODIUM SERPL-SCNC: 146 MMOL/L (ref 134–144)

## 2020-09-09 DIAGNOSIS — E78.01 FAMILIAL HYPERCHOLESTEROLEMIA: ICD-10-CM

## 2020-09-09 DIAGNOSIS — R60.0 LOCALIZED EDEMA: ICD-10-CM

## 2020-09-09 RX ORDER — HYDROCHLOROTHIAZIDE 25 MG/1
TABLET ORAL
Qty: 90 TABLET | Refills: 0 | Status: SHIPPED | OUTPATIENT
Start: 2020-09-09 | End: 2020-09-21 | Stop reason: SDUPTHER

## 2020-09-09 RX ORDER — SIMVASTATIN 20 MG
TABLET ORAL
Qty: 90 TABLET | Refills: 3 | Status: SHIPPED | OUTPATIENT
Start: 2020-09-09 | End: 2020-09-21 | Stop reason: SDUPTHER

## 2020-09-21 ENCOUNTER — OFFICE VISIT (OUTPATIENT)
Dept: FAMILY MEDICINE CLINIC | Facility: CLINIC | Age: 55
End: 2020-09-21

## 2020-09-21 VITALS
HEART RATE: 81 BPM | OXYGEN SATURATION: 95 % | HEIGHT: 63 IN | DIASTOLIC BLOOD PRESSURE: 80 MMHG | RESPIRATION RATE: 16 BRPM | BODY MASS INDEX: 34.55 KG/M2 | WEIGHT: 195 LBS | TEMPERATURE: 97.4 F | SYSTOLIC BLOOD PRESSURE: 128 MMHG

## 2020-09-21 DIAGNOSIS — N94.6 DYSMENORRHEA: ICD-10-CM

## 2020-09-21 DIAGNOSIS — E78.01 FAMILIAL HYPERCHOLESTEROLEMIA: ICD-10-CM

## 2020-09-21 DIAGNOSIS — K58.2 IRRITABLE BOWEL SYNDROME WITH BOTH CONSTIPATION AND DIARRHEA: ICD-10-CM

## 2020-09-21 DIAGNOSIS — F20.0 PARANOID SCHIZOPHRENIA (HCC): ICD-10-CM

## 2020-09-21 DIAGNOSIS — Z00.01 ENCOUNTER FOR GENERAL ADULT MEDICAL EXAMINATION WITH ABNORMAL FINDINGS: Primary | ICD-10-CM

## 2020-09-21 DIAGNOSIS — R60.0 LOCALIZED EDEMA: ICD-10-CM

## 2020-09-21 DIAGNOSIS — D72.820 LYMPHOCYTOSIS: ICD-10-CM

## 2020-09-21 DIAGNOSIS — E78.2 MIXED HYPERLIPIDEMIA: ICD-10-CM

## 2020-09-21 DIAGNOSIS — J43.2 CENTRILOBULAR EMPHYSEMA (HCC): ICD-10-CM

## 2020-09-21 DIAGNOSIS — Z23 NEED FOR IMMUNIZATION AGAINST INFLUENZA: ICD-10-CM

## 2020-09-21 DIAGNOSIS — K21.9 GASTROESOPHAGEAL REFLUX DISEASE WITHOUT ESOPHAGITIS: ICD-10-CM

## 2020-09-21 PROCEDURE — 90686 IIV4 VACC NO PRSV 0.5 ML IM: CPT | Performed by: FAMILY MEDICINE

## 2020-09-21 PROCEDURE — 90471 IMMUNIZATION ADMIN: CPT | Performed by: FAMILY MEDICINE

## 2020-09-21 PROCEDURE — G0439 PPPS, SUBSEQ VISIT: HCPCS | Performed by: FAMILY MEDICINE

## 2020-09-21 RX ORDER — SIMVASTATIN 20 MG
20 TABLET ORAL NIGHTLY
Qty: 90 TABLET | Refills: 3 | Status: SHIPPED | OUTPATIENT
Start: 2020-09-21 | End: 2021-10-21 | Stop reason: SDUPTHER

## 2020-09-21 RX ORDER — HYDROCHLOROTHIAZIDE 25 MG/1
25 TABLET ORAL DAILY
Qty: 90 TABLET | Refills: 3 | Status: SHIPPED | OUTPATIENT
Start: 2020-09-21 | End: 2021-10-21 | Stop reason: SDUPTHER

## 2020-09-21 NOTE — ASSESSMENT & PLAN NOTE
She has significant lower extremity edema and she stopped taking her thiazide diuretic.  We will recheck blood work and monitor renal function.

## 2020-09-21 NOTE — ASSESSMENT & PLAN NOTE
Better with dietary control   Liver/Psoas biopsy Abdominal abcess drainage ERCP/EUS Reversal of ileostomy Right hemicolectomy

## 2020-09-21 NOTE — ASSESSMENT & PLAN NOTE
Discussed injury prevention, diet and exercise, safe sexual practices, and screening for common diseases. Encouraged use of sunscreen and seatbelts. Encouraged SBE, avoidance of tobacco, limiting alcohol, and yearly dental and eye exams.  She declines mammogram again this year.  She declines colon cancer screening.  Patient will be due for Pap smear and HPV testing next year.

## 2020-09-21 NOTE — PROGRESS NOTES
Chief Complaint   Patient presents with   • Medicare Wellness-subsequent   • Hyperlipidemia       Subjective   History of Present Illness:  Judy Sosa is a 55 y.o. female who presents for a Subsequent Medicare Wellness Visit.    HEALTH RISK ASSESSMENT    Recent Hospitalizations:  No hospitalization(s) within the last year.    Current Medical Providers:  Patient Care Team:  Lyell, Reggie Duane, MD as PCP - General  Franck Haq as PCP - Claims Attributed    Smoking Status:  Social History     Tobacco Use   Smoking Status Current Every Day Smoker   • Packs/day: 1.00   • Types: Cigarettes   Smokeless Tobacco Never Used       Alcohol Consumption:  Social History     Substance and Sexual Activity   Alcohol Use No   • Frequency: Never       Depression Screen:   PHQ-2/PHQ-9 Depression Screening 9/21/2020   Little interest or pleasure in doing things 0   Feeling down, depressed, or hopeless 0   Trouble falling or staying asleep, or sleeping too much 0   Feeling tired or having little energy 0   Poor appetite or overeating 0   Feeling bad about yourself - or that you are a failure or have let yourself or your family down 0   Trouble concentrating on things, such as reading the newspaper or watching television 0   Moving or speaking so slowly that other people could have noticed. Or the opposite - being so fidgety or restless that you have been moving around a lot more than usual 0   Thoughts that you would be better off dead, or of hurting yourself in some way 0   Total Score 0   If you checked off any problems, how difficult have these problems made it for you to do your work, take care of things at home, or get along with other people? Not difficult at all       Fall Risk Screen:  STEADI Fall Risk Assessment was completed, and patient is at LOW risk for falls.Assessment completed on:9/21/2020    Health Habits and Functional and Cognitive Screening:  Functional & Cognitive Status 9/21/2020   Do you have  difficulty preparing food and eating? No   Do you have difficulty bathing yourself, getting dressed or grooming yourself? No   Do you have difficulty using the toilet? No   Do you have difficulty moving around from place to place? No   Do you have trouble with steps or getting out of a bed or a chair? No   Current Diet Well Balanced Diet   Dental Exam Not up to date   Eye Exam Not up to date   Exercise (times per week) 0 times per week   Current Exercise Activities Include None   Do you need help using the phone?  No   Are you deaf or do you have serious difficulty hearing?  No   Do you need help with transportation? No   Do you need help shopping? No   Do you need help preparing meals?  No   Do you need help with housework?  No   Do you need help with laundry? No   Do you need help taking your medications? No   Do you need help managing money? No   Do you ever drive or ride in a car without wearing a seat belt? No   Have you felt unusual stress, anger or loneliness in the last month? No   Who do you live with? Alone   If you need help, do you have trouble finding someone available to you? No   Have you been bothered in the last four weeks by sexual problems? No   Do you have difficulty concentrating, remembering or making decisions? No         Does the patient have evidence of cognitive impairment? Yes    Asprin use counseling:Does not need ASA (and currently is not on it)    Age-appropriate Screening Schedule:  Refer to the list below for future screening recommendations based on patient's age, sex and/or medical conditions. Orders for these recommended tests are listed in the plan section. The patient has been provided with a written plan.    Health Maintenance   Topic Date Due   • PNEUMOCOCCAL VACCINE (19-64 MEDIUM RISK) (1 of 1 - PPSV23) 08/15/1983   • TDAP/TD VACCINES (1 - Tdap) 08/15/1983   • ZOSTER VACCINE (1 of 2) 08/15/2014   • PAP SMEAR  09/13/2019   • COLONOSCOPY  09/13/2019   • MAMMOGRAM  09/14/2019   •  INFLUENZA VACCINE  08/01/2020   • LIPID PANEL  09/19/2020          The following portions of the patient's history were reviewed and updated as appropriate: allergies, current medications, past family history, past medical history, past social history, past surgical history and problem list.    Outpatient Medications Prior to Visit   Medication Sig Dispense Refill   • Bioflavonoid Products (Vitamin C Plus) 1000 MG tablet Take 1 tablet by mouth Daily.     • OLANZapine (ZYPREXA) 10 MG tablet ZYPREXA 10 MG TABS     • propranolol (INDERAL) 20 MG tablet PROPRANOLOL HCL 20 MG TABS     • topiramate (TOPAMAX) 200 MG tablet TOPAMAX 200 MG TABS     • hydroCHLOROthiazide (HYDRODIURIL) 25 MG tablet TAKE ONE TABLET BY MOUTH ONCE DAILY 90 tablet 0   • simvastatin (ZOCOR) 20 MG tablet TAKE ONE TABLET BY MOUTH EVERY NIGHT FOR CHOLESTEROL 90 tablet 3     No facility-administered medications prior to visit.        Patient Active Problem List   Diagnosis   • Amenorrhea   • Chronic obstructive pulmonary disease (CMS/HCC)   • Colitis   • Dysmenorrhea   • Edema   • Elevated white blood cell count   • Gastroesophageal reflux disease   • Hyperlipidemia   • Irritable bowel syndrome   • Paranoid schizophrenia (CMS/[facility])   • Encounter for Medicare annual wellness exam       Advanced Care Planning:  ACP discussion was held with the patient during this visit. Patient does not have an advance directive, information provided.    Review of Systems   Constitutional: Negative for activity change, appetite change, chills, fatigue, fever and unexpected weight change.   HENT: Negative for congestion, ear discharge, ear pain, facial swelling, hearing loss, nosebleeds, postnasal drip, rhinorrhea, sinus pressure, sinus pain, sneezing, sore throat, tinnitus, trouble swallowing and voice change.    Eyes: Negative for photophobia, pain, discharge, redness, itching and visual disturbance.   Respiratory: Negative for apnea, cough, choking, chest tightness,  "shortness of breath and wheezing.    Cardiovascular: Negative for chest pain and palpitations.   Gastrointestinal: Negative for abdominal distention, abdominal pain, blood in stool, constipation, diarrhea, nausea and vomiting.   Endocrine: Negative for cold intolerance, heat intolerance, polydipsia and polyphagia.   Genitourinary: Negative for difficulty urinating, dysuria, enuresis, flank pain, frequency, hematuria, pelvic pain and urgency.   Musculoskeletal: Negative for arthralgias, back pain, gait problem, joint swelling, myalgias, neck pain and neck stiffness.   Skin: Negative for pallor, rash and wound.   Allergic/Immunologic: Negative for environmental allergies and food allergies.   Neurological: Negative for dizziness, tremors, seizures, syncope, speech difficulty, weakness, light-headedness, numbness and headaches.   Hematological: Negative for adenopathy. Does not bruise/bleed easily.   Psychiatric/Behavioral: Negative for confusion, decreased concentration, hallucinations and sleep disturbance. The patient is not nervous/anxious.        Compared to one year ago, the patient feels her physical health is the same.  Compared to one year ago, the patient feels her mental health is the same.    Reviewed chart for potential of high risk medication in the elderly: yes  Reviewed chart for potential of harmful drug interactions in the elderly:yes    Objective         Vitals:    09/21/20 1258   BP: 128/80   BP Location: Left arm   Patient Position: Sitting   Cuff Size: Adult   Pulse: 81   Resp: 16   Temp: 97.4 °F (36.3 °C)   TempSrc: Infrared   SpO2: 95%   Weight: 88.5 kg (195 lb)   Height: 160 cm (63\")   PainSc: 0-No pain       Body mass index is 34.54 kg/m².  Discussed the patient's BMI with her. The BMI is below average; BMI management plan is completed.    Physical Exam  Constitutional:       Appearance: She is well-developed.   HENT:      Head: Normocephalic and atraumatic.   Eyes:      General: No scleral " icterus.        Right eye: No discharge.         Left eye: No discharge.      Conjunctiva/sclera: Conjunctivae normal.      Pupils: Pupils are equal, round, and reactive to light.   Neck:      Thyroid: No thyromegaly.   Cardiovascular:      Rate and Rhythm: Normal rate and regular rhythm.      Heart sounds: Normal heart sounds. No murmur. No friction rub. No gallop.    Pulmonary:      Effort: Pulmonary effort is normal. No respiratory distress.      Breath sounds: Normal breath sounds. No wheezing or rales.   Chest:      Chest wall: No mass, deformity, swelling or tenderness.      Breasts: Breasts are symmetrical.         Right: No mass, nipple discharge, skin change or tenderness.         Left: No mass, nipple discharge, skin change or tenderness.   Abdominal:      General: Bowel sounds are normal. There is no distension.      Palpations: Abdomen is soft. There is no mass.      Tenderness: There is no abdominal tenderness. There is no guarding or rebound.   Musculoskeletal: Normal range of motion.         General: No tenderness or deformity.   Lymphadenopathy:      Cervical: No cervical adenopathy.   Skin:     General: Skin is warm and dry.      Findings: No erythema or rash.   Neurological:      Mental Status: She is alert and oriented to person, place, and time.      Cranial Nerves: No cranial nerve deficit.      Sensory: No sensory deficit.      Motor: No abnormal muscle tone.      Coordination: Coordination normal.      Deep Tendon Reflexes: Reflexes normal.   Psychiatric:         Behavior: Behavior normal.         Thought Content: Thought content normal.         Judgment: Judgment normal.               Assessment/Plan   Medicare Risks and Personalized Health Plan  CMS Preventative Services Quick Reference  Advance Directive Discussion  Dementia/Memory   Fall Risk  Obesity/Overweight     The above risks/problems have been discussed with the patient.  Pertinent information has been shared with the patient in the  After Visit Summary.  Follow up plans and orders are seen below in the Assessment/Plan Section.    Diagnoses and all orders for this visit:    1. Encounter for general adult medical examination with abnormal findings (Primary)    2. Mixed hyperlipidemia  Assessment & Plan:  Recheck blood work and call with results    Orders:  -     Comprehensive Metabolic Panel  -     Lipid Panel    3. Centrilobular emphysema (CMS/HCC)  Assessment & Plan:  Encouraged tobacco cessation.  Patient interested.        4. Gastroesophageal reflux disease without esophagitis  Assessment & Plan:  Stable.  Currently off of proton pump inhibitor but using as needed usage.      5. Irritable bowel syndrome with both constipation and diarrhea  Assessment & Plan:  Better with dietary control      6. Dysmenorrhea  Assessment & Plan:  Stable at present.      7. Lymphocytosis  Assessment & Plan:  Repeat CBC and call with results.    Orders:  -     CBC & Differential    8. Localized edema  Assessment & Plan:  She has significant lower extremity edema and she stopped taking her thiazide diuretic.  We will recheck blood work and monitor renal function.    Orders:  -     hydroCHLOROthiazide (HYDRODIURIL) 25 MG tablet; Take 1 tablet by mouth Daily.  Dispense: 90 tablet; Refill: 3    9. Paranoid schizophrenia (CMS/HCC)  Assessment & Plan:  Continue follow-up with psychiatry.      10. Need for immunization against influenza  -     Fluarix/Fluzone/Afluria Quad>6 Months    11. Familial hypercholesterolemia  Assessment & Plan:  Recheck blood work and call with results    Orders:  -     simvastatin (ZOCOR) 20 MG tablet; Take 1 tablet by mouth Every Night.  Dispense: 90 tablet; Refill: 3    Follow Up:  No follow-ups on file.     An After Visit Summary and PPPS were given to the patient.

## 2020-09-22 LAB
ALBUMIN SERPL-MCNC: 4.4 G/DL (ref 3.8–4.9)
ALBUMIN/GLOB SERPL: 1.6 {RATIO} (ref 1.2–2.2)
ALP SERPL-CCNC: 110 IU/L (ref 39–117)
ALT SERPL-CCNC: 28 IU/L (ref 0–32)
AST SERPL-CCNC: 20 IU/L (ref 0–40)
BASOPHILS # BLD AUTO: 0.1 X10E3/UL (ref 0–0.2)
BASOPHILS NFR BLD AUTO: 1 %
BILIRUB SERPL-MCNC: 0.3 MG/DL (ref 0–1.2)
BUN SERPL-MCNC: 9 MG/DL (ref 6–24)
BUN/CREAT SERPL: 10 (ref 9–23)
CALCIUM SERPL-MCNC: 8.7 MG/DL (ref 8.7–10.2)
CHLORIDE SERPL-SCNC: 103 MMOL/L (ref 96–106)
CHOLEST SERPL-MCNC: 139 MG/DL (ref 100–199)
CO2 SERPL-SCNC: 27 MMOL/L (ref 20–29)
CREAT SERPL-MCNC: 0.86 MG/DL (ref 0.57–1)
EOSINOPHIL # BLD AUTO: 0.3 X10E3/UL (ref 0–0.4)
EOSINOPHIL NFR BLD AUTO: 2 %
ERYTHROCYTE [DISTWIDTH] IN BLOOD BY AUTOMATED COUNT: 12.7 % (ref 11.7–15.4)
GLOBULIN SER CALC-MCNC: 2.7 G/DL (ref 1.5–4.5)
GLUCOSE SERPL-MCNC: 82 MG/DL (ref 65–99)
HCT VFR BLD AUTO: 47.3 % (ref 34–46.6)
HDLC SERPL-MCNC: 38 MG/DL
HGB BLD-MCNC: 16.3 G/DL (ref 11.1–15.9)
IMM GRANULOCYTES # BLD AUTO: 0.1 X10E3/UL (ref 0–0.1)
IMM GRANULOCYTES NFR BLD AUTO: 1 %
LDLC SERPL CALC-MCNC: 78 MG/DL (ref 0–99)
LYMPHOCYTES # BLD AUTO: 3.6 X10E3/UL (ref 0.7–3.1)
LYMPHOCYTES NFR BLD AUTO: 30 %
MCH RBC QN AUTO: 31.6 PG (ref 26.6–33)
MCHC RBC AUTO-ENTMCNC: 34.5 G/DL (ref 31.5–35.7)
MCV RBC AUTO: 92 FL (ref 79–97)
MONOCYTES # BLD AUTO: 0.6 X10E3/UL (ref 0.1–0.9)
MONOCYTES NFR BLD AUTO: 5 %
NEUTROPHILS # BLD AUTO: 7.5 X10E3/UL (ref 1.4–7)
NEUTROPHILS NFR BLD AUTO: 61 %
PLATELET # BLD AUTO: 299 X10E3/UL (ref 150–450)
POTASSIUM SERPL-SCNC: 3.5 MMOL/L (ref 3.5–5.2)
PROT SERPL-MCNC: 7.1 G/DL (ref 6–8.5)
RBC # BLD AUTO: 5.16 X10E6/UL (ref 3.77–5.28)
SODIUM SERPL-SCNC: 145 MMOL/L (ref 134–144)
TRIGL SERPL-MCNC: 127 MG/DL (ref 0–149)
VLDLC SERPL CALC-MCNC: 23 MG/DL (ref 5–40)
WBC # BLD AUTO: 12.2 X10E3/UL (ref 3.4–10.8)

## 2020-09-22 NOTE — PROGRESS NOTES
Let patient know that her blood work is stable and there are no significant changes from previous years.  No changes recommended and repeat in 1 year.

## 2021-09-07 ENCOUNTER — OFFICE VISIT (OUTPATIENT)
Dept: FAMILY MEDICINE CLINIC | Facility: CLINIC | Age: 56
End: 2021-09-07

## 2021-09-07 ENCOUNTER — HOSPITAL ENCOUNTER (OUTPATIENT)
Dept: GENERAL RADIOLOGY | Facility: HOSPITAL | Age: 56
Discharge: HOME | End: 2021-09-07
Admitting: NURSE PRACTITIONER

## 2021-09-07 VITALS
OXYGEN SATURATION: 93 % | TEMPERATURE: 98.9 F | RESPIRATION RATE: 18 BRPM | DIASTOLIC BLOOD PRESSURE: 81 MMHG | HEIGHT: 63 IN | BODY MASS INDEX: 32.25 KG/M2 | HEART RATE: 68 BPM | WEIGHT: 182 LBS | SYSTOLIC BLOOD PRESSURE: 122 MMHG

## 2021-09-07 DIAGNOSIS — R07.9 CHEST PAIN, UNSPECIFIED TYPE: Primary | ICD-10-CM

## 2021-09-07 DIAGNOSIS — R05.9 COUGH: ICD-10-CM

## 2021-09-07 DIAGNOSIS — L30.9 DERMATITIS: ICD-10-CM

## 2021-09-07 DIAGNOSIS — F20.0 PARANOID SCHIZOPHRENIA (HCC): ICD-10-CM

## 2021-09-07 DIAGNOSIS — Z20.822 SUSPECTED COVID-19 VIRUS INFECTION: ICD-10-CM

## 2021-09-07 DIAGNOSIS — J44.1 COPD WITH EXACERBATION (HCC): ICD-10-CM

## 2021-09-07 PROBLEM — Z00.00 ENCOUNTER FOR MEDICARE ANNUAL WELLNESS EXAM: Status: RESOLVED | Noted: 2019-09-19 | Resolved: 2021-09-07

## 2021-09-07 LAB
EXPIRATION DATE: NORMAL
FLUAV AG NPH QL: NEGATIVE
FLUBV AG NPH QL: NEGATIVE
INTERNAL CONTROL: NORMAL
Lab: NORMAL

## 2021-09-07 PROCEDURE — 87804 INFLUENZA ASSAY W/OPTIC: CPT | Performed by: NURSE PRACTITIONER

## 2021-09-07 PROCEDURE — 71046 X-RAY EXAM CHEST 2 VIEWS: CPT

## 2021-09-07 PROCEDURE — 93005 ELECTROCARDIOGRAM TRACING: CPT | Performed by: NURSE PRACTITIONER

## 2021-09-07 PROCEDURE — 99214 OFFICE O/P EST MOD 30 MIN: CPT | Performed by: NURSE PRACTITIONER

## 2021-09-07 RX ORDER — CEPHALEXIN 500 MG/1
500 CAPSULE ORAL 3 TIMES DAILY
Qty: 30 CAPSULE | Refills: 0 | Status: SHIPPED | OUTPATIENT
Start: 2021-09-07 | End: 2021-09-17

## 2021-09-07 RX ORDER — ALBUTEROL SULFATE 90 UG/1
2 AEROSOL, METERED RESPIRATORY (INHALATION) EVERY 4 HOURS PRN
Qty: 18 G | Refills: 0 | Status: SHIPPED | OUTPATIENT
Start: 2021-09-07 | End: 2021-10-18

## 2021-09-07 RX ORDER — TRIAMCINOLONE ACETONIDE 0.25 MG/G
OINTMENT TOPICAL 2 TIMES DAILY
Qty: 15 G | Refills: 0 | Status: SHIPPED | OUTPATIENT
Start: 2021-09-07 | End: 2021-09-14

## 2021-09-07 NOTE — ASSESSMENT & PLAN NOTE
Followed by Psychiatry.   This makes her care challenging as she is afraid that she will have an adverse effect/event with most medications and treatments that we discussed.

## 2021-09-07 NOTE — PATIENT INSTRUCTIONS

## 2021-09-07 NOTE — ASSESSMENT & PLAN NOTE
Today's EKG is unremarkable.   Will get stress test to further evaluate.   Discussed warning signs for seeking emergency care.

## 2021-09-08 ENCOUNTER — TELEPHONE (OUTPATIENT)
Dept: FAMILY MEDICINE CLINIC | Facility: CLINIC | Age: 56
End: 2021-09-08

## 2021-09-08 LAB — SARS-COV-2 RNA RESP QL NAA+PROBE: NOT DETECTED

## 2021-09-13 ENCOUNTER — TELEPHONE (OUTPATIENT)
Dept: FAMILY MEDICINE CLINIC | Facility: CLINIC | Age: 56
End: 2021-09-13

## 2021-09-13 NOTE — TELEPHONE ENCOUNTER
Scheduling called to schedule patient for the stress test that you ordered and she has declined scheduling this stating that she does not want to have the test done.

## 2021-09-13 NOTE — TELEPHONE ENCOUNTER
She has been having chest pain and I ordered a stress test for her. She now says she doesn't want to do it. She has an appointment with you later this month. Can you please discuss this with her further?

## 2021-09-20 ENCOUNTER — TELEPHONE (OUTPATIENT)
Dept: FAMILY MEDICINE CLINIC | Facility: CLINIC | Age: 56
End: 2021-09-20

## 2021-09-20 NOTE — TELEPHONE ENCOUNTER
Provider: DR ARRIOLA  Caller: ALE VALLE  Relationship to Patient: PATIENT  Phone Number: [phone]  Reason for Call: PATIENT STATED THAT SHE FELL ABOUT A WEEK AGO AND BROKE SOME RIBS.  SHE JUST WANTED TO MAKE DR ARRIOLA AWARE OF THIS.   When was the patient last seen: 09/07/2021

## 2021-09-30 ENCOUNTER — OFFICE VISIT (OUTPATIENT)
Dept: FAMILY MEDICINE CLINIC | Facility: CLINIC | Age: 56
End: 2021-09-30

## 2021-09-30 VITALS
RESPIRATION RATE: 20 BRPM | HEART RATE: 81 BPM | DIASTOLIC BLOOD PRESSURE: 69 MMHG | TEMPERATURE: 97.9 F | SYSTOLIC BLOOD PRESSURE: 108 MMHG | BODY MASS INDEX: 32.99 KG/M2 | WEIGHT: 186.2 LBS | OXYGEN SATURATION: 93 % | HEIGHT: 63 IN

## 2021-09-30 DIAGNOSIS — F20.0 PARANOID SCHIZOPHRENIA (HCC): ICD-10-CM

## 2021-09-30 DIAGNOSIS — R07.9 CHEST PAIN, UNSPECIFIED TYPE: ICD-10-CM

## 2021-09-30 DIAGNOSIS — Z23 NEED FOR VACCINATION: ICD-10-CM

## 2021-09-30 DIAGNOSIS — J44.1 COPD WITH EXACERBATION (HCC): Primary | ICD-10-CM

## 2021-09-30 PROCEDURE — G0008 ADMIN INFLUENZA VIRUS VAC: HCPCS | Performed by: FAMILY MEDICINE

## 2021-09-30 PROCEDURE — 90686 IIV4 VACC NO PRSV 0.5 ML IM: CPT | Performed by: FAMILY MEDICINE

## 2021-09-30 PROCEDURE — 99213 OFFICE O/P EST LOW 20 MIN: CPT | Performed by: FAMILY MEDICINE

## 2021-10-01 NOTE — ASSESSMENT & PLAN NOTE
Condition definitely contributes to her refusal of treatment.  Patient's pressured speech persist during the entire encounter.

## 2021-10-01 NOTE — ASSESSMENT & PLAN NOTE
Again patient declines stress testing.  The emergency room visit if chest pain persist.  She really does not want to do anything about it.  Given her tobacco history, age, and risk factors I have concerns about it being equivalent but she is not interested in further work-up.  She is aware of the risk of undiagnosed heart disease.

## 2021-10-01 NOTE — ASSESSMENT & PLAN NOTE
Chest x-ray was questionable for atelectasis versus pneumonia.  She is completed antibiotics.  I strongly encouraged her to go ahead and get her short acting beta agonist.  She said she will consider it but is not sure she is going to get the prescription filled.  I recommend repeating a chest x-ray in 4 to 6 weeks.  She is to report any worsening.  Her Covid testing was negative.

## 2021-10-18 ENCOUNTER — HOSPITAL ENCOUNTER (OUTPATIENT)
Dept: GENERAL RADIOLOGY | Facility: HOSPITAL | Age: 56
Discharge: HOME | End: 2021-10-18
Admitting: FAMILY MEDICINE

## 2021-10-18 ENCOUNTER — OFFICE VISIT (OUTPATIENT)
Dept: FAMILY MEDICINE CLINIC | Facility: CLINIC | Age: 56
End: 2021-10-18

## 2021-10-18 VITALS
SYSTOLIC BLOOD PRESSURE: 107 MMHG | HEIGHT: 63 IN | HEART RATE: 84 BPM | RESPIRATION RATE: 18 BRPM | WEIGHT: 181 LBS | BODY MASS INDEX: 32.07 KG/M2 | OXYGEN SATURATION: 91 % | DIASTOLIC BLOOD PRESSURE: 73 MMHG | TEMPERATURE: 97.2 F

## 2021-10-18 DIAGNOSIS — K52.9 COLITIS: ICD-10-CM

## 2021-10-18 DIAGNOSIS — D72.820 LYMPHOCYTOSIS: ICD-10-CM

## 2021-10-18 DIAGNOSIS — Z00.00 ENCOUNTER FOR SUBSEQUENT ANNUAL WELLNESS VISIT (AWV) IN MEDICARE PATIENT: Primary | ICD-10-CM

## 2021-10-18 DIAGNOSIS — R05.9 COUGH: ICD-10-CM

## 2021-10-18 DIAGNOSIS — E78.2 MIXED HYPERLIPIDEMIA: ICD-10-CM

## 2021-10-18 DIAGNOSIS — J44.1 COPD WITH EXACERBATION (HCC): ICD-10-CM

## 2021-10-18 DIAGNOSIS — R07.2 PRECORDIAL PAIN: ICD-10-CM

## 2021-10-18 DIAGNOSIS — F20.0 PARANOID SCHIZOPHRENIA (HCC): ICD-10-CM

## 2021-10-18 DIAGNOSIS — N94.6 DYSMENORRHEA: ICD-10-CM

## 2021-10-18 DIAGNOSIS — E87.6 HYPOKALEMIA: ICD-10-CM

## 2021-10-18 DIAGNOSIS — K21.9 GASTROESOPHAGEAL REFLUX DISEASE WITHOUT ESOPHAGITIS: ICD-10-CM

## 2021-10-18 PROCEDURE — 1159F MED LIST DOCD IN RCRD: CPT | Performed by: FAMILY MEDICINE

## 2021-10-18 PROCEDURE — 1126F AMNT PAIN NOTED NONE PRSNT: CPT | Performed by: FAMILY MEDICINE

## 2021-10-18 PROCEDURE — G0439 PPPS, SUBSEQ VISIT: HCPCS | Performed by: FAMILY MEDICINE

## 2021-10-18 PROCEDURE — 96372 THER/PROPH/DIAG INJ SC/IM: CPT | Performed by: FAMILY MEDICINE

## 2021-10-18 PROCEDURE — 71046 X-RAY EXAM CHEST 2 VIEWS: CPT

## 2021-10-18 RX ORDER — DEXAMETHASONE SODIUM PHOSPHATE 10 MG/ML
10 INJECTION INTRAMUSCULAR; INTRAVENOUS ONCE
Status: COMPLETED | OUTPATIENT
Start: 2021-10-18 | End: 2021-10-18

## 2021-10-18 RX ORDER — METHYLPREDNISOLONE ACETATE 80 MG/ML
80 INJECTION, SUSPENSION INTRA-ARTICULAR; INTRALESIONAL; INTRAMUSCULAR; SOFT TISSUE ONCE
Status: DISCONTINUED | OUTPATIENT
Start: 2021-10-18 | End: 2021-10-18

## 2021-10-18 RX ADMIN — DEXAMETHASONE SODIUM PHOSPHATE 10 MG: 10 INJECTION INTRAMUSCULAR; INTRAVENOUS at 15:03

## 2021-10-18 NOTE — ASSESSMENT & PLAN NOTE
We will repeat a chest x-ray.  I suspect COPD exacerbation but patient declines inhalers or tobacco cessation.

## 2021-10-18 NOTE — ASSESSMENT & PLAN NOTE
Updated annual wellness visit checklist.  Immunizations discussed.  Screening up-to-date.  Recommend yearly dental and eye exams. Also discussed monitoring of blood pressure and lipids.

## 2021-10-18 NOTE — PROGRESS NOTES
The ABCs of the Annual Wellness Visit  Subsequent Medicare Wellness Visit    Chief Complaint   Patient presents with   • Medicare Wellness-subsequent      Subjective    History of Present Illness:  Judy Sosa is a 56 y.o. female who presents for a Subsequent Medicare Wellness Visit.    The following portions of the patient's history were reviewed and   updated as appropriate: allergies, current medications, past family history, past medical history, past social history, past surgical history and problem list.    Compared to one year ago, the patient feels her physical   health is the same.    Compared to one year ago, the patient feels her mental   health is the same.    Recent Hospitalizations:  She was not admitted to the hospital during the last year.       Current Medical Providers:  Patient Care Team:  Lyell, Reggie Duane, MD as PCP - General    Outpatient Medications Prior to Visit   Medication Sig Dispense Refill   • Bioflavonoid Products (Vitamin C Plus) 1000 MG tablet Take 1 tablet by mouth Daily.     • hydroCHLOROthiazide (HYDRODIURIL) 25 MG tablet Take 1 tablet by mouth Daily. 90 tablet 3   • OLANZapine (ZYPREXA) 10 MG tablet ZYPREXA 10 MG TABS     • propranolol (INDERAL) 20 MG tablet PROPRANOLOL HCL 20 MG TABS     • simvastatin (ZOCOR) 20 MG tablet Take 1 tablet by mouth Every Night. 90 tablet 3   • topiramate (TOPAMAX) 200 MG tablet TOPAMAX 200 MG TABS     • albuterol sulfate  (90 Base) MCG/ACT inhaler Inhale 2 puffs Every 4 (Four) Hours As Needed for Wheezing. 18 g 0     No facility-administered medications prior to visit.       No opioid medication identified on active medication list. I have reviewed chart for other potential  high risk medication/s and harmful drug interactions in the elderly.          Aspirin is not on active medication list.  Aspirin use is not indicated based on review of current medical condition/s. Risk of harm outweighs potential benefits.  .    Patient Active  "Problem List   Diagnosis   • Amenorrhea   • Chronic obstructive pulmonary disease (HCC)   • Colitis   • Dysmenorrhea   • Edema   • Elevated white blood cell count   • Gastroesophageal reflux disease   • Hyperlipidemia   • Irritable bowel syndrome   • Paranoid schizophrenia (HCC)   • COPD with exacerbation (HCC)   • Chest pain   • Dermatitis     Advance Care Planning  Advance Directive is not on file.  ACP discussion was held with the patient during this visit. Patient does not have an advance directive, information provided.          Objective    Vitals:    10/18/21 1431   BP: 107/73   BP Location: Right arm   Patient Position: Sitting   Cuff Size: Large Adult   Pulse: 84   Resp: 18   Temp: 97.2 °F (36.2 °C)   TempSrc: Infrared   SpO2: 91%   Weight: 82.1 kg (181 lb)   Height: 160 cm (63\")   PainSc: 0-No pain     BMI Readings from Last 1 Encounters:   10/18/21 32.06 kg/m²   BMI is above normal parameters. Recommendations include: exercise counseling and nutrition counseling    Does the patient have evidence of cognitive impairment? No    Physical Exam            HEALTH RISK ASSESSMENT    Smoking Status:  Social History     Tobacco Use   Smoking Status Current Every Day Smoker   • Packs/day: 1.00   • Years: 38.00   • Pack years: 38.00   • Types: Cigarettes   Smokeless Tobacco Never Used     Alcohol Consumption:  Social History     Substance and Sexual Activity   Alcohol Use No     Fall Risk Screen:    ALFREDO Fall Risk Assessment was completed, and patient is at HIGH risk for falls. Assessment completed on:10/18/2021    Depression Screening:  PHQ-2/PHQ-9 Depression Screening 10/18/2021   Little interest or pleasure in doing things 1   Feeling down, depressed, or hopeless 3   Trouble falling or staying asleep, or sleeping too much 1   Feeling tired or having little energy 3   Poor appetite or overeating 1   Feeling bad about yourself - or that you are a failure or have let yourself or your family down 1   Trouble " concentrating on things, such as reading the newspaper or watching television 1   Moving or speaking so slowly that other people could have noticed. Or the opposite - being so fidgety or restless that you have been moving around a lot more than usual 1   Thoughts that you would be better off dead, or of hurting yourself in some way 0   Total Score 12   If you checked off any problems, how difficult have these problems made it for you to do your work, take care of things at home, or get along with other people? Somewhat difficult       Health Habits and Functional and Cognitive Screening:  Functional & Cognitive Status 10/18/2021   Do you have difficulty preparing food and eating? No   Do you have difficulty bathing yourself, getting dressed or grooming yourself? No   Do you have difficulty using the toilet? No   Do you have difficulty moving around from place to place? No   Do you have trouble with steps or getting out of a bed or a chair? No   Current Diet Well Balanced Diet   Dental Exam Up to date   Eye Exam Up to date   Exercise (times per week) 0 times per week   Current Exercises Include No Regular Exercise   Current Exercise Activities Include -   Do you need help using the phone?  No   Are you deaf or do you have serious difficulty hearing?  No   Do you need help with transportation? Yes   Do you need help shopping? No   Do you need help preparing meals?  No   Do you need help with housework?  No   Do you need help with laundry? No   Do you need help taking your medications? No   Do you need help managing money? No   Do you ever drive or ride in a car without wearing a seat belt? No   Have you felt unusual stress, anger or loneliness in the last month? Yes   Who do you live with? Alone   If you need help, do you have trouble finding someone available to you? Yes   Have you been bothered in the last four weeks by sexual problems? -   Do you have difficulty concentrating, remembering or making decisions? No        Age-appropriate Screening Schedule:  Refer to the list below for future screening recommendations based on patient's age, sex and/or medical conditions. Orders for these recommended tests are listed in the plan section. The patient has been provided with a written plan.    Health Maintenance   Topic Date Due   • ZOSTER VACCINE (1 of 2) Never done   • PAP SMEAR  Never done   • MAMMOGRAM  09/14/2019   • TDAP/TD VACCINES (2 - Tdap) 12/30/2020   • LIPID PANEL  09/21/2021   • INFLUENZA VACCINE  Completed              Assessment/Plan   CMS Preventative Services Quick Reference  Risk Factors Identified During Encounter  Dementia/Memory   Depression/Dysphoria  Fall Risk-High or Moderate  Inactivity/Sedentary  Obesity/Overweight   Tobacco Use/Dependance (use dotphrase .tobaccocessation for documentation)  The above risks/problems have been discussed with the patient.  Follow up actions/plans if indicated are seen below in the Assessment/Plan Section.  Pertinent information has been shared with the patient in the After Visit Summary.    There are no diagnoses linked to this encounter.    Follow Up:   No follow-ups on file.     An After Visit Summary and PPPS were made available to the patient.

## 2021-10-18 NOTE — ASSESSMENT & PLAN NOTE
Strongly encouraged tobacco cessation.  Patient requested steroid shot.  She has thrown away her inhalers because she does not want the side effects.  We will see if steroids help her any at all.

## 2021-10-18 NOTE — ASSESSMENT & PLAN NOTE
Persist but patient declined further work-up.  Warning signs discussed.  Most likely musculoskeletal due to coughing.

## 2021-10-19 LAB
ALBUMIN SERPL-MCNC: 4.4 G/DL (ref 3.8–4.9)
ALBUMIN/GLOB SERPL: 1.7 {RATIO} (ref 1.2–2.2)
ALP SERPL-CCNC: 101 IU/L (ref 44–121)
ALT SERPL-CCNC: 19 IU/L (ref 0–32)
AST SERPL-CCNC: 16 IU/L (ref 0–40)
BASOPHILS # BLD AUTO: 0.1 X10E3/UL (ref 0–0.2)
BASOPHILS NFR BLD AUTO: 1 %
BILIRUB SERPL-MCNC: 0.3 MG/DL (ref 0–1.2)
BUN SERPL-MCNC: 7 MG/DL (ref 6–24)
BUN/CREAT SERPL: 9 (ref 9–23)
CALCIUM SERPL-MCNC: 8.5 MG/DL (ref 8.7–10.2)
CHLORIDE SERPL-SCNC: 98 MMOL/L (ref 96–106)
CHOLEST SERPL-MCNC: 120 MG/DL (ref 100–199)
CO2 SERPL-SCNC: 28 MMOL/L (ref 20–29)
CREAT SERPL-MCNC: 0.77 MG/DL (ref 0.57–1)
EOSINOPHIL # BLD AUTO: 0.3 X10E3/UL (ref 0–0.4)
EOSINOPHIL NFR BLD AUTO: 3 %
ERYTHROCYTE [DISTWIDTH] IN BLOOD BY AUTOMATED COUNT: 12.9 % (ref 11.7–15.4)
GLOBULIN SER CALC-MCNC: 2.6 G/DL (ref 1.5–4.5)
GLUCOSE SERPL-MCNC: 113 MG/DL (ref 65–99)
HCT VFR BLD AUTO: 43.6 % (ref 34–46.6)
HDLC SERPL-MCNC: 40 MG/DL
HGB BLD-MCNC: 15.2 G/DL (ref 11.1–15.9)
IMM GRANULOCYTES # BLD AUTO: 0.1 X10E3/UL (ref 0–0.1)
IMM GRANULOCYTES NFR BLD AUTO: 1 %
LDLC SERPL CALC-MCNC: 59 MG/DL (ref 0–99)
LYMPHOCYTES # BLD AUTO: 3.1 X10E3/UL (ref 0.7–3.1)
LYMPHOCYTES NFR BLD AUTO: 25 %
MCH RBC QN AUTO: 31.2 PG (ref 26.6–33)
MCHC RBC AUTO-ENTMCNC: 34.9 G/DL (ref 31.5–35.7)
MCV RBC AUTO: 90 FL (ref 79–97)
MONOCYTES # BLD AUTO: 0.7 X10E3/UL (ref 0.1–0.9)
MONOCYTES NFR BLD AUTO: 5 %
NEUTROPHILS # BLD AUTO: 8.4 X10E3/UL (ref 1.4–7)
NEUTROPHILS NFR BLD AUTO: 65 %
PLATELET # BLD AUTO: 314 X10E3/UL (ref 150–450)
POTASSIUM SERPL-SCNC: 3 MMOL/L (ref 3.5–5.2)
PROT SERPL-MCNC: 7 G/DL (ref 6–8.5)
RBC # BLD AUTO: 4.87 X10E6/UL (ref 3.77–5.28)
SODIUM SERPL-SCNC: 142 MMOL/L (ref 134–144)
TRIGL SERPL-MCNC: 117 MG/DL (ref 0–149)
VLDLC SERPL CALC-MCNC: 21 MG/DL (ref 5–40)
WBC # BLD AUTO: 12.7 X10E3/UL (ref 3.4–10.8)

## 2021-10-20 ENCOUNTER — TELEPHONE (OUTPATIENT)
Dept: FAMILY MEDICINE CLINIC | Facility: CLINIC | Age: 56
End: 2021-10-20

## 2021-10-20 NOTE — TELEPHONE ENCOUNTER
Caller: Judy Sosa    Relationship: Self    Best call back number: [phone]    What test was performed: CHEST XRAY AND LABS    When was the test performed: 10/18    Where was the test performed: IN OFFICE    Additional notes: PLEASE CALL WITH RESULTS

## 2021-10-21 DIAGNOSIS — R60.0 LOCALIZED EDEMA: ICD-10-CM

## 2021-10-21 DIAGNOSIS — E78.01 FAMILIAL HYPERCHOLESTEROLEMIA: ICD-10-CM

## 2021-10-21 RX ORDER — HYDROCHLOROTHIAZIDE 25 MG/1
25 TABLET ORAL DAILY
Qty: 90 TABLET | Refills: 3 | Status: SHIPPED | OUTPATIENT
Start: 2021-10-21 | End: 2022-10-13 | Stop reason: SDUPTHER

## 2021-10-21 RX ORDER — SIMVASTATIN 20 MG
20 TABLET ORAL NIGHTLY
Qty: 90 TABLET | Refills: 3 | Status: SHIPPED | OUTPATIENT
Start: 2021-10-21 | End: 2022-10-13 | Stop reason: SDUPTHER

## 2021-10-21 NOTE — TELEPHONE ENCOUNTER
PATIENT STATES FOLLOWING THESE RESULTS DR. ARRIOLA WAS SUPPOSED TO CALL IN POTASSIUM FOR HER... SHE HAS NOT RECEIVED THEM NOR HAS THE PHARMACY. PLEASE CALL IN OR EXPLAIN WHY NOT.    [phone] NO VM HANGUP AND TRY AGAIN

## 2021-10-21 NOTE — TELEPHONE ENCOUNTER
Caller: Judy Sosa    Relationship: Self    Requested Prescriptions:   Requested Prescriptions     Pending Prescriptions Disp Refills   • simvastatin (ZOCOR) 20 MG tablet 90 tablet 3     Sig: Take 1 tablet by mouth Every Night.   • hydroCHLOROthiazide (HYDRODIURIL) 25 MG tablet 90 tablet 3     Sig: Take 1 tablet by mouth Daily.        Pharmacy where request should be sent:[facility] [[email or web address]] [address] [place] [id number] [place] [phone] [facility]       PATIENT ASKING IF DR ARRIOLA IS GOING TO PRESCRIBE POTASSIUM AND/OR BLOOD THINNERS     PLEASE ADVISE     Additional details provided by patient: HAS 2-3 WEEKS OF MEDICATION LEFT     Best call back number: [phone]    Does the patient have less than a 3 day supply:  [] Yes  [x] No    Keenan Avila Rep   10/21/21 14:23 EDT

## 2021-10-26 ENCOUNTER — TELEPHONE (OUTPATIENT)
Dept: FAMILY MEDICINE CLINIC | Facility: CLINIC | Age: 56
End: 2021-10-26

## 2021-10-26 NOTE — TELEPHONE ENCOUNTER
Called patient back and she was requesting that the potassium prescription be sent in for her. In the last note you stated you would send it in. I advised her I would send the message and as soon as it is sent I will give her a call.

## 2021-10-26 NOTE — TELEPHONE ENCOUNTER
PT CALLED STATING SHE WAS TOLD SHE NEEDED POTASSIUM AFTER HE RECENT LAB RESULTS. SHE IS REQUESTING ACALL BACK TO SEE IF IT IS GOING TO BE CALLED IN.

## 2021-10-27 RX ORDER — POTASSIUM CHLORIDE 20 MEQ/1
40 TABLET, EXTENDED RELEASE ORAL DAILY
Qty: 180 TABLET | Refills: 1 | Status: SHIPPED | OUTPATIENT
Start: 2021-10-27 | End: 2022-10-13 | Stop reason: SDUPTHER

## 2022-10-06 ENCOUNTER — TELEPHONE (OUTPATIENT)
Dept: FAMILY MEDICINE CLINIC | Facility: CLINIC | Age: 57
End: 2022-10-06

## 2022-10-06 NOTE — TELEPHONE ENCOUNTER
n / a   Could not leave message     Provider will be out of the office due to family emergency  We are needing to reschedule the 10/7/22 appt     Hub to read

## 2022-10-07 ENCOUNTER — TELEPHONE (OUTPATIENT)
Dept: FAMILY MEDICINE CLINIC | Facility: CLINIC | Age: 57
End: 2022-10-07

## 2022-10-07 NOTE — TELEPHONE ENCOUNTER
n/a  Provider will be out of the office due to family emergency  We are needing to reschedule the 10/7/22 appt   Hub to read

## 2022-10-13 ENCOUNTER — OFFICE VISIT (OUTPATIENT)
Dept: FAMILY MEDICINE CLINIC | Facility: CLINIC | Age: 57
End: 2022-10-13

## 2022-10-13 ENCOUNTER — HOSPITAL ENCOUNTER (OUTPATIENT)
Dept: GENERAL RADIOLOGY | Facility: HOSPITAL | Age: 57
Discharge: HOME | End: 2022-10-13

## 2022-10-13 VITALS
TEMPERATURE: 98 F | OXYGEN SATURATION: 94 % | BODY MASS INDEX: 34.02 KG/M2 | SYSTOLIC BLOOD PRESSURE: 116 MMHG | DIASTOLIC BLOOD PRESSURE: 69 MMHG | HEIGHT: 63 IN | WEIGHT: 192 LBS | RESPIRATION RATE: 16 BRPM | HEART RATE: 84 BPM

## 2022-10-13 DIAGNOSIS — R06.89 ADVENTITIOUS BREATH SOUNDS: ICD-10-CM

## 2022-10-13 DIAGNOSIS — E78.01 FAMILIAL HYPERCHOLESTEROLEMIA: ICD-10-CM

## 2022-10-13 DIAGNOSIS — K21.9 GASTROESOPHAGEAL REFLUX DISEASE WITHOUT ESOPHAGITIS: ICD-10-CM

## 2022-10-13 DIAGNOSIS — R53.83 FATIGUE, UNSPECIFIED TYPE: ICD-10-CM

## 2022-10-13 DIAGNOSIS — Z12.31 SCREENING MAMMOGRAM FOR BREAST CANCER: ICD-10-CM

## 2022-10-13 DIAGNOSIS — Z71.1 WORRIED WELL: Primary | ICD-10-CM

## 2022-10-13 DIAGNOSIS — R60.0 LOCALIZED EDEMA: ICD-10-CM

## 2022-10-13 DIAGNOSIS — F43.21 GRIEF: ICD-10-CM

## 2022-10-13 DIAGNOSIS — R09.89 CHEST CONGESTION: ICD-10-CM

## 2022-10-13 DIAGNOSIS — J30.2 SEASONAL ALLERGIES: ICD-10-CM

## 2022-10-13 DIAGNOSIS — F17.200 HEAVY TOBACCO SMOKER: ICD-10-CM

## 2022-10-13 DIAGNOSIS — Z12.11 SCREENING FOR COLON CANCER: ICD-10-CM

## 2022-10-13 DIAGNOSIS — J44.9 CHRONIC OBSTRUCTIVE PULMONARY DISEASE, UNSPECIFIED COPD TYPE: ICD-10-CM

## 2022-10-13 DIAGNOSIS — F20.0 PARANOID SCHIZOPHRENIA: ICD-10-CM

## 2022-10-13 DIAGNOSIS — K59.09 OTHER CONSTIPATION: ICD-10-CM

## 2022-10-13 DIAGNOSIS — J06.9 URI, ACUTE: ICD-10-CM

## 2022-10-13 PROCEDURE — 99214 OFFICE O/P EST MOD 30 MIN: CPT

## 2022-10-13 PROCEDURE — 71046 X-RAY EXAM CHEST 2 VIEWS: CPT

## 2022-10-13 RX ORDER — AZITHROMYCIN 250 MG/1
TABLET, FILM COATED ORAL
Qty: 6 TABLET | Refills: 0 | Status: SHIPPED | OUTPATIENT
Start: 2022-10-13

## 2022-10-13 RX ORDER — HYDROCHLOROTHIAZIDE 25 MG/1
25 TABLET ORAL DAILY
Qty: 90 TABLET | Refills: 1 | Status: SHIPPED | OUTPATIENT
Start: 2022-10-13

## 2022-10-13 RX ORDER — DOCUSATE SODIUM 100 MG/1
100 CAPSULE, LIQUID FILLED ORAL 2 TIMES DAILY
Qty: 30 CAPSULE | Refills: 2 | Status: SHIPPED | OUTPATIENT
Start: 2022-10-13 | End: 2022-11-12

## 2022-10-13 RX ORDER — SIMVASTATIN 20 MG
20 TABLET ORAL NIGHTLY
Qty: 90 TABLET | Refills: 1 | Status: SHIPPED | OUTPATIENT
Start: 2022-10-13

## 2022-10-13 RX ORDER — POLYETHYLENE GLYCOL 3350 17 G/17G
17 POWDER, FOR SOLUTION ORAL DAILY
Qty: 10 PACKET | Refills: 0 | Status: SHIPPED | OUTPATIENT
Start: 2022-10-13 | End: 2022-10-23

## 2022-10-13 RX ORDER — CETIRIZINE HYDROCHLORIDE 10 MG/1
10 TABLET ORAL DAILY
Qty: 30 TABLET | Refills: 4 | Status: SHIPPED | OUTPATIENT
Start: 2022-10-13

## 2022-10-13 RX ORDER — POTASSIUM CHLORIDE 20 MEQ/1
40 TABLET, EXTENDED RELEASE ORAL DAILY
Qty: 180 TABLET | Refills: 1 | Status: SHIPPED | OUTPATIENT
Start: 2022-10-13

## 2022-10-13 RX ORDER — GUAIFENESIN, PSEUDOEPHEDRINE HYDROCHLORIDE 600; 60 MG/1; MG/1
1 TABLET, EXTENDED RELEASE ORAL EVERY 12 HOURS
Qty: 10 TABLET | Refills: 0 | Status: SHIPPED | OUTPATIENT
Start: 2022-10-13 | End: 2022-10-18

## 2022-10-13 NOTE — ASSESSMENT & PLAN NOTE
Avoid eating or drinking approximately three hours before bed.  Avoid tobacco use, spicy foods, alcohol, caffeinated beverages or other foods that irritate GERD.  Sleep elevated 10-15 degrees.  Take medication as needed.    Reports very little medication use.

## 2022-10-13 NOTE — ASSESSMENT & PLAN NOTE
Patient reports chronic cough and chest congestion.  She is a smoker.  She refuses lung CT.  She agrees to take medication to help decrease cough frequency.  Adventitious lung sounds.  Chest x-ray negative for pneumonia.

## 2022-10-13 NOTE — ASSESSMENT & PLAN NOTE
Sees Dr. Feliciano at [facility]. Next appt 10/2022. Reports stable but requests grief counseling due to loss of good friend 2 years ago.

## 2022-10-13 NOTE — ASSESSMENT & PLAN NOTE
She reports a history of chronic constipation.  She states she does not drink a lot of water.  Advised MiraLAX as needed, Colace as needed.  Patient agrees to increase water consumption as well as fresh fruits and vegetables.

## 2022-10-13 NOTE — PROGRESS NOTES
Chief Complaint  Hypertension    Subjective        Judy Sosa presents to [facility] for  History of Present Illness      Patient presents to establish care with new provider.  Has been established with other provider in this office and well managed.  Here for routine lab and medication refills.   Dr. Haq at [facility] manages her psychiatric drugs.            Judy Sosa  has a past medical history of Amenorrhea, Colitis, COPD (chronic obstructive pulmonary disease) (Formerly [facility]), Dysmenorrhea, Edema, Elevated white blood cell count, GERD (gastroesophageal reflux disease), Hyperlipidemia, IBS (irritable bowel syndrome), Palpitation, and Paranoid schizophrenia (Formerly [facility]).      Review of Systems   Constitutional: Negative for activity change, appetite change, chills, fatigue and fever.   HENT: Positive for congestion and rhinorrhea.    Respiratory: Positive for cough, shortness of breath and wheezing. Negative for chest tightness.    Cardiovascular: Negative for chest pain, palpitations and leg swelling.   Gastrointestinal: Positive for constipation. Negative for abdominal distention, abdominal pain, anal bleeding, blood in stool, diarrhea, nausea, rectal pain and vomiting.   Musculoskeletal: Negative for back pain and neck pain.   Skin: Negative for rash.   Allergic/Immunologic: Positive for environmental allergies.   Neurological: Negative for dizziness, weakness and headaches.   Psychiatric/Behavioral: Positive for confusion and decreased concentration. Negative for self-injury, sleep disturbance and suicidal ideas. The patient is nervous/anxious.         Objective       Current Outpatient Medications:   •  Bioflavonoid Products (Vitamin C Plus) 1000 MG tablet, Take 1 tablet by mouth Daily., Disp: , Rfl:   •  hydroCHLOROthiazide (HYDRODIURIL) 25 MG tablet, Take 1 tablet by mouth Daily., Disp: 90 tablet, Rfl: 1  •  OLANZapine (zyPREXA) 10 MG tablet, ZYPREXA 10 MG TABS, Disp: ,  "Rfl:   •  potassium chloride (K-DUR,KLOR-CON) 20 MEQ CR tablet, Take 2 tablets by mouth Daily., Disp: 180 tablet, Rfl: 1  •  propranolol (INDERAL) 20 MG tablet, PROPRANOLOL HCL 20 MG TABS, Disp: , Rfl:   •  simvastatin (ZOCOR) 20 MG tablet, Take 1 tablet by mouth Every Night., Disp: 90 tablet, Rfl: 1  •  topiramate (TOPAMAX) 200 MG tablet, TOPAMAX 200 MG TABS, Disp: , Rfl:   •  azithromycin (Zithromax Z-Rafael) 250 MG tablet, Take 2 tablets by mouth on day 1, then 1 tablet daily on days 2-5, Disp: 6 tablet, Rfl: 0  •  cetirizine (zyrTEC) 10 MG tablet, Take 1 tablet by mouth Daily., Disp: 30 tablet, Rfl: 4  •  docusate sodium (Colace) 100 MG capsule, Take 1 capsule by mouth 2 (Two) Times a Day for 30 days., Disp: 30 capsule, Rfl: 2  •  polyethylene glycol (MIRALAX) 17 g packet, Take 17 g by mouth Daily for 10 days., Disp: 10 packet, Rfl: 0  •  pseudoephedrine-guaifenesin (MUCINEX D)  MG per 12 hr tablet, Take 1 tablet by mouth Every 12 (Twelve) Hours for 5 days., Disp: 10 tablet, Rfl: 0    Vital Signs:  /69 (BP Location: Right arm, Patient Position: Sitting, Cuff Size: Large Adult)   Pulse 84   Temp 98 °F (36.7 °C) (Infrared)   Resp 16   Ht 160 cm (63\")   Wt 87.1 kg (192 lb)   SpO2 94%   BMI 34.01 kg/m²   Vitals:    10/13/22 1524   BP: 116/69   BP Location: Right arm   Patient Position: Sitting   Cuff Size: Large Adult   Pulse: 84   Resp: 16   Temp: 98 °F (36.7 °C)   TempSrc: Infrared   SpO2: 94%   Weight: 87.1 kg (192 lb)   Height: 160 cm (63\")        Estimated body mass index is 34.01 kg/m² as calculated from the following:    Height as of this encounter: 160 cm (63\").    Weight as of this encounter: 87.1 kg (192 lb).    BMI is >= 30 and <35. (Class 1 Obesity). The following options were offered after discussion;: nutrition counseling/recommendations      Physical Exam  Vitals and nursing note reviewed.   Constitutional:       General: She is not in acute distress.     Appearance: Normal appearance. " She is well-groomed. She is obese.      Interventions: She is not intubated.  Neck:      Thyroid: No thyroid mass, thyromegaly or thyroid tenderness.      Vascular: No carotid bruit.   Cardiovascular:      Rate and Rhythm: Normal rate and regular rhythm.      Pulses: Normal pulses.      Heart sounds: Normal heart sounds.   Pulmonary:      Effort: Pulmonary effort is normal. No tachypnea, bradypnea, accessory muscle usage, prolonged expiration or respiratory distress. She is not intubated.      Breath sounds: Examination of the right-middle field reveals rhonchi. Examination of the left-middle field reveals rhonchi. Rhonchi present.   Chest:      Chest wall: No tenderness.   Musculoskeletal:      Right lower le+ Edema present.      Left lower le+ Edema present.   Skin:     General: Skin is warm and dry.      Capillary Refill: Capillary refill takes less than 2 seconds.   Neurological:      Mental Status: She is alert and oriented to person, place, and time.   Psychiatric:         Attention and Perception: Attention and perception normal.         Mood and Affect: Mood is anxious and elated.         Speech: Speech normal.         Behavior: Behavior normal. Behavior is cooperative.         Thought Content: Thought content normal.         Cognition and Memory: Cognition and memory normal.         Judgment: Judgment normal.        Result Review :  The following data was reviewed by: TIERRA Mckeon on 10/13/2022:      Data reviewed: Recent hospitalization notes [facility]       PHQ-9 Total Score:          Assessment and Plan   Diagnoses and all orders for this visit:    1. Worried well (Primary)    2. Localized edema  Assessment & Plan:  Edema in ankles/feet with loss of medication.   Has not had water pills for a while.    Orders:  -     hydroCHLOROthiazide (HYDRODIURIL) 25 MG tablet; Take 1 tablet by mouth Daily.  Dispense: 90 tablet; Refill: 1  -     potassium chloride (K-DUR,KLOR-CON) 20 MEQ CR tablet; Take 2  tablets by mouth Daily.  Dispense: 180 tablet; Refill: 1    3. Familial hypercholesterolemia  Assessment & Plan:  Lipid levels will be checked.  Continue diet, lifestyle and medication.         Orders:  -     simvastatin (ZOCOR) 20 MG tablet; Take 1 tablet by mouth Every Night.  Dispense: 90 tablet; Refill: 1  -     CBC Auto Differential  -     Comprehensive Metabolic Panel  -     Lipid Panel    4. Paranoid schizophrenia (HCC)  Assessment & Plan:  Sees Dr. Feliciano at [facility]. Next appt 10/2022. Reports stable but requests grief counseling due to loss of good friend 2 years ago.      5. Seasonal allergies  Assessment & Plan:  Nasal congestion.  Advised daily cetirizine.    Orders:  -     cetirizine (zyrTEC) 10 MG tablet; Take 1 tablet by mouth Daily.  Dispense: 30 tablet; Refill: 4    6. Grief    7. Gastroesophageal reflux disease without esophagitis  Assessment & Plan:  Avoid eating or drinking approximately three hours before bed.  Avoid tobacco use, spicy foods, alcohol, caffeinated beverages or other foods that irritate GERD.  Sleep elevated 10-15 degrees.  Take medication as needed.    Reports very little medication use.        8. Other constipation  Assessment & Plan:  She reports a history of chronic constipation.  She states she does not drink a lot of water.  Advised MiraLAX as needed, Colace as needed.  Patient agrees to increase water consumption as well as fresh fruits and vegetables.    Orders:  -     polyethylene glycol (MIRALAX) 17 g packet; Take 17 g by mouth Daily for 10 days.  Dispense: 10 packet; Refill: 0  -     docusate sodium (Colace) 100 MG capsule; Take 1 capsule by mouth 2 (Two) Times a Day for 30 days.  Dispense: 30 capsule; Refill: 2    9. Screening mammogram for breast cancer  -     Mammo Screening Digital Tomosynthesis Bilateral With CAD; Future    10. Heavy tobacco smoker  Assessment & Plan:  Refuses lung CT .  3ppd average.  Has smoked for approx 40 yrs      11. Screening for colon  cancer  -     Ambulatory Referral For Screening Colonoscopy    12. Chronic obstructive pulmonary disease, unspecified COPD type (HCC)  -     XR Chest PA & Lateral    13. Chest congestion  Assessment & Plan:  Patient reports chronic cough and chest congestion.  She is a smoker.  She refuses lung CT.  She agrees to take medication to help decrease cough frequency.  Adventitious lung sounds.  Chest x-ray negative for pneumonia.    Orders:  -     XR Chest PA & Lateral  -     pseudoephedrine-guaifenesin (MUCINEX D)  MG per 12 hr tablet; Take 1 tablet by mouth Every 12 (Twelve) Hours for 5 days.  Dispense: 10 tablet; Refill: 0    14. Adventitious breath sounds  -     XR Chest PA & Lateral    15. Fatigue, unspecified type  -     XR Chest PA & Lateral    16. URI, acute  Assessment & Plan:  Here with congested cough, uncontrolled allergies with nasal congestion    Orders:  -     pseudoephedrine-guaifenesin (MUCINEX D)  MG per 12 hr tablet; Take 1 tablet by mouth Every 12 (Twelve) Hours for 5 days.  Dispense: 10 tablet; Refill: 0  -     azithromycin (Zithromax Z-Rafael) 250 MG tablet; Take 2 tablets by mouth on day 1, then 1 tablet daily on days 2-5  Dispense: 6 tablet; Refill: 0           Follow Up   Return if symptoms worsen or fail to improve, for Annual physical.  Patient was given instructions and counseling regarding her condition or for health maintenance advice. Please see specific information pulled into the AVS if appropriate.     I wore protective equipment  (mask and gloves if needed) throughout this patient encounter. Hand hygiene was performed before and after patient encounter.        Findings discussed.  All questions answered.

## 2022-10-14 LAB
ALBUMIN SERPL-MCNC: 4.3 G/DL (ref 3.8–4.9)
ALBUMIN/GLOB SERPL: 1.6 {RATIO} (ref 1.2–2.2)
ALP SERPL-CCNC: 97 IU/L (ref 44–121)
ALT SERPL-CCNC: 15 IU/L (ref 0–32)
AST SERPL-CCNC: 16 IU/L (ref 0–40)
BASOPHILS # BLD AUTO: 0 X10E3/UL (ref 0–0.2)
BASOPHILS NFR BLD AUTO: 0 %
BILIRUB SERPL-MCNC: 0.3 MG/DL (ref 0–1.2)
BUN SERPL-MCNC: 15 MG/DL (ref 6–24)
BUN/CREAT SERPL: 18 (ref 9–23)
CALCIUM SERPL-MCNC: 8.6 MG/DL (ref 8.7–10.2)
CHLORIDE SERPL-SCNC: 102 MMOL/L (ref 96–106)
CHOLEST SERPL-MCNC: 141 MG/DL (ref 100–199)
CO2 SERPL-SCNC: 27 MMOL/L (ref 20–29)
CREAT SERPL-MCNC: 0.85 MG/DL (ref 0.57–1)
EGFRCR SERPLBLD CKD-EPI 2021: 79 ML/MIN/1.73
EOSINOPHIL # BLD AUTO: 0.3 X10E3/UL (ref 0–0.4)
EOSINOPHIL NFR BLD AUTO: 3 %
ERYTHROCYTE [DISTWIDTH] IN BLOOD BY AUTOMATED COUNT: 12.7 % (ref 11.7–15.4)
GLOBULIN SER CALC-MCNC: 2.7 G/DL (ref 1.5–4.5)
GLUCOSE SERPL-MCNC: 109 MG/DL (ref 70–99)
HCT VFR BLD AUTO: 47.1 % (ref 34–46.6)
HDLC SERPL-MCNC: 43 MG/DL
HGB BLD-MCNC: 15.6 G/DL (ref 11.1–15.9)
IMM GRANULOCYTES # BLD AUTO: 0.1 X10E3/UL (ref 0–0.1)
IMM GRANULOCYTES NFR BLD AUTO: 1 %
LDLC SERPL CALC-MCNC: 70 MG/DL (ref 0–99)
LYMPHOCYTES # BLD AUTO: 3.3 X10E3/UL (ref 0.7–3.1)
LYMPHOCYTES NFR BLD AUTO: 29 %
MCH RBC QN AUTO: 30 PG (ref 26.6–33)
MCHC RBC AUTO-ENTMCNC: 33.1 G/DL (ref 31.5–35.7)
MCV RBC AUTO: 91 FL (ref 79–97)
MONOCYTES # BLD AUTO: 0.6 X10E3/UL (ref 0.1–0.9)
MONOCYTES NFR BLD AUTO: 5 %
NEUTROPHILS # BLD AUTO: 7.2 X10E3/UL (ref 1.4–7)
NEUTROPHILS NFR BLD AUTO: 62 %
PLATELET # BLD AUTO: 290 X10E3/UL (ref 150–450)
POTASSIUM SERPL-SCNC: 3.5 MMOL/L (ref 3.5–5.2)
PROT SERPL-MCNC: 7 G/DL (ref 6–8.5)
RBC # BLD AUTO: 5.2 X10E6/UL (ref 3.77–5.28)
SODIUM SERPL-SCNC: 144 MMOL/L (ref 134–144)
TRIGL SERPL-MCNC: 167 MG/DL (ref 0–149)
VLDLC SERPL CALC-MCNC: 28 MG/DL (ref 5–40)
WBC # BLD AUTO: 11.6 X10E3/UL (ref 3.4–10.8)

## 2022-10-18 NOTE — PROGRESS NOTES
Please advise patient labs look relatively unchanged from last visit. Advise her to work on decreasing her consumption of high carb foods.

## 2022-11-07 ENCOUNTER — APPOINTMENT (OUTPATIENT)
Dept: MAMMOGRAPHY | Facility: HOSPITAL | Age: 57
End: 2022-11-07

## 2023-04-18 DIAGNOSIS — R60.0 LOCALIZED EDEMA: ICD-10-CM

## 2023-04-18 DIAGNOSIS — E78.01 FAMILIAL HYPERCHOLESTEROLEMIA: ICD-10-CM

## 2023-04-18 RX ORDER — SIMVASTATIN 20 MG
TABLET ORAL
Qty: 90 TABLET | Refills: 1 | Status: SHIPPED | OUTPATIENT
Start: 2023-04-18

## 2023-04-18 RX ORDER — HYDROCHLOROTHIAZIDE 25 MG/1
TABLET ORAL
Qty: 90 TABLET | Refills: 1 | Status: SHIPPED | OUTPATIENT
Start: 2023-04-18

## 2023-05-19 ENCOUNTER — OFFICE VISIT (OUTPATIENT)
Dept: FAMILY MEDICINE CLINIC | Facility: CLINIC | Age: 58
End: 2023-05-19
Payer: MEDICARE

## 2023-05-19 VITALS
WEIGHT: 198 LBS | DIASTOLIC BLOOD PRESSURE: 70 MMHG | OXYGEN SATURATION: 98 % | TEMPERATURE: 97.5 F | HEART RATE: 86 BPM | RESPIRATION RATE: 16 BRPM | HEIGHT: 63 IN | BODY MASS INDEX: 35.08 KG/M2 | SYSTOLIC BLOOD PRESSURE: 119 MMHG

## 2023-05-19 DIAGNOSIS — J30.2 SEASONAL ALLERGIES: ICD-10-CM

## 2023-05-19 DIAGNOSIS — Z00.00 MEDICARE ANNUAL WELLNESS VISIT, SUBSEQUENT: Primary | ICD-10-CM

## 2023-05-19 DIAGNOSIS — R60.0 LOCALIZED EDEMA: ICD-10-CM

## 2023-05-19 DIAGNOSIS — E78.01 FAMILIAL HYPERCHOLESTEROLEMIA: ICD-10-CM

## 2023-05-19 RX ORDER — HYDROCHLOROTHIAZIDE 25 MG/1
25 TABLET ORAL DAILY
Qty: 90 TABLET | Refills: 1 | Status: SHIPPED | OUTPATIENT
Start: 2023-05-19

## 2023-05-19 RX ORDER — SIMVASTATIN 20 MG
20 TABLET ORAL
Qty: 90 TABLET | Refills: 1 | Status: SHIPPED | OUTPATIENT
Start: 2023-05-19

## 2023-05-19 RX ORDER — CETIRIZINE HYDROCHLORIDE 10 MG/1
10 TABLET ORAL DAILY
Qty: 30 TABLET | Refills: 4 | Status: SHIPPED | OUTPATIENT
Start: 2023-05-19

## 2023-05-19 RX ORDER — POTASSIUM CHLORIDE 20 MEQ/1
40 TABLET, EXTENDED RELEASE ORAL DAILY
Qty: 180 TABLET | Refills: 1 | Status: SHIPPED | OUTPATIENT
Start: 2023-05-19

## 2023-05-19 NOTE — PROGRESS NOTES
The ABCs of the Annual Wellness Visit  Subsequent Medicare Wellness Visit    Subjective      Judy Sosa is a 57 y.o. female who presents for a Subsequent Medicare Wellness Visit.    The following portions of the patient's history were reviewed and   updated as appropriate: allergies, current medications, past family history, past medical history, past social history, past surgical history and problem list.    Compared to one year ago, the patient feels her physical   health is better.    Compared to one year ago, the patient feels her mental   health is better.    Recent Hospitalizations:  She was not admitted to the hospital during the last year.       Current Medical Providers:  Patient Care Team:  Joie Luo APRN as PCP - General (Nurse Practitioner)    Outpatient Medications Prior to Visit   Medication Sig Dispense Refill   • Bioflavonoid Products (Vitamin C Plus) 1000 MG tablet Take 1 tablet by mouth Daily.     • OLANZapine (zyPREXA) 10 MG tablet ZYPREXA 10 MG TABS     • propranolol (INDERAL) 20 MG tablet PROPRANOLOL HCL 20 MG TABS     • topiramate (TOPAMAX) 200 MG tablet TOPAMAX 200 MG TABS     • cetirizine (zyrTEC) 10 MG tablet Take 1 tablet by mouth Daily. 30 tablet 4   • hydroCHLOROthiazide (HYDRODIURIL) 25 MG tablet TAKE ONE TABLET BY MOUTH ONCE DAILY 90 tablet 1   • potassium chloride (K-DUR,KLOR-CON) 20 MEQ CR tablet Take 2 tablets by mouth Daily. 180 tablet 1   • simvastatin (ZOCOR) 20 MG tablet TAKE ONE TABLET BY MOUTH EVERY NIGHT 90 tablet 1   • azithromycin (Zithromax Z-Rafael) 250 MG tablet Take 2 tablets by mouth on day 1, then 1 tablet daily on days 2-5 6 tablet 0     No facility-administered medications prior to visit.       No opioid medication identified on active medication list. I have reviewed chart for other potential  high risk medication/s and harmful drug interactions in the elderly.          Aspirin is not on active medication list.  Aspirin use is not indicated based on review  "of current medical condition/s. Risk of harm outweighs potential benefits.  .    Patient Active Problem List   Diagnosis   • Amenorrhea   • Chronic obstructive pulmonary disease   • Colitis   • Cough   • Dysmenorrhea   • Edema   • Elevated white blood cell count   • Gastroesophageal reflux disease   • Hyperlipidemia   • Irritable bowel syndrome   • Paranoid schizophrenia   • Encounter for subsequent annual wellness visit (AWV) in Medicare patient   • COPD with exacerbation   • Chest pain   • Dermatitis   • Seasonal allergies   • Familial hypercholesterolemia   • Localized edema   • Grief   • Other constipation   • Worried well   • Screening mammogram for breast cancer   • Heavy tobacco smoker   • Screening for colon cancer   • Fatigue   • Adventitious breath sounds   • Chest congestion   • URI, acute     Advance Care Planning   Advance Care Planning     Advance Directive is not on file.  ACP discussion was held with the patient during this visit. Patient has an advance directive (not in EMR), copy requested.     Objective    Vitals:    05/19/23 1332   BP: 119/70   BP Location: Left arm   Patient Position: Sitting   Cuff Size: Large Adult   Pulse: 86   Resp: 16   Temp: 97.5 °F (36.4 °C)   TempSrc: Infrared   SpO2: 98%   Weight: 89.8 kg (198 lb)   Height: 160 cm (63\")   PainSc: 0-No pain     Estimated body mass index is 35.07 kg/m² as calculated from the following:    Height as of this encounter: 160 cm (63\").    Weight as of this encounter: 89.8 kg (198 lb).    BMI is >= 30 and <35. (Class 1 Obesity). The following options were offered after discussion;: weight loss educational material (shared in after visit summary), exercise counseling/recommendations and nutrition counseling/recommendations      Does the patient have evidence of cognitive impairment?   No            HEALTH RISK ASSESSMENT    Smoking Status:  Social History     Tobacco Use   Smoking Status Every Day   • Packs/day: 1.00   • Years: 38.00   • Pack " years: 38.00   • Types: Cigarettes   Smokeless Tobacco Never     Alcohol Consumption:  Social History     Substance and Sexual Activity   Alcohol Use No     Fall Risk Screen:    ANALIADI Fall Risk Assessment was completed, and patient is at MODERATE risk for falls. Assessment completed on:2023    Depression Screenin/19/2023     1:36 PM   PHQ-2/PHQ-9 Depression Screening   Little Interest or Pleasure in Doing Things 0-->not at all   Feeling Down, Depressed or Hopeless 0-->not at all   PHQ-9: Brief Depression Severity Measure Score 0       Health Habits and Functional and Cognitive Screenin/19/2023     1:00 PM   Functional & Cognitive Status   Do you have difficulty preparing food and eating? No   Do you have difficulty bathing yourself, getting dressed or grooming yourself? No   Do you have difficulty using the toilet? No   Do you have difficulty moving around from place to place? No   Do you have trouble with steps or getting out of a bed or a chair? No   Current Diet Unhealthy Diet   Dental Exam Not up to date   Eye Exam Not up to date   Exercise (times per week) 0 times per week   Current Exercises Include No Regular Exercise   Do you need help using the phone?  No   Are you deaf or do you have serious difficulty hearing?  No   Do you need help with transportation? No   Do you need help shopping? No   Do you need help preparing meals?  No   Do you need help with housework?  No   Do you need help with laundry? No   Do you need help taking your medications? No   Do you need help managing money? No   Do you ever drive or ride in a car without wearing a seat belt? No   Have you felt unusual stress, anger or loneliness in the last month? Yes   Who do you live with? Alone   If you need help, do you have trouble finding someone available to you? No   Do you have difficulty concentrating, remembering or making decisions? No       Age-appropriate Screening Schedule:  Refer to the list below for future  screening recommendations based on patient's age, sex and/or medical conditions. Orders for these recommended tests are listed in the plan section. The patient has been provided with a written plan.    Health Maintenance   Topic Date Due   • Pneumococcal Vaccine 0-64 (1 - PCV) Never done   • ZOSTER VACCINE (1 of 2) Never done   • HEPATITIS C SCREENING  Never done   • PAP SMEAR  Never done   • TDAP/TD VACCINES (2 - Tdap) 12/30/2020   • COVID-19 Vaccine (4 - Booster for Pfizer series) 11/30/2022   • LUNG CANCER SCREENING  10/13/2023 (Originally 8/15/2014)   • INFLUENZA VACCINE  08/01/2023   • LIPID PANEL  10/13/2023   • ANNUAL WELLNESS VISIT  05/19/2024   • COLORECTAL CANCER SCREENING  05/19/2026                  CMS Preventative Services Quick Reference  Risk Factors Identified During Encounter:    grief - her roommate passed away     The above risks/problems have been discussed with the patient.  Pertinent information has been shared with the patient in the After Visit Summary.    Diagnoses and all orders for this visit:    1. Medicare annual wellness visit, subsequent (Primary)    2. Familial hypercholesterolemia  -     simvastatin (ZOCOR) 20 MG tablet; Take 1 tablet by mouth every night at bedtime.  Dispense: 90 tablet; Refill: 1    3. Localized edema  -     potassium chloride (K-DUR,KLOR-CON) 20 MEQ CR tablet; Take 2 tablets by mouth Daily.  Dispense: 180 tablet; Refill: 1  -     hydroCHLOROthiazide (HYDRODIURIL) 25 MG tablet; Take 1 tablet by mouth Daily.  Dispense: 90 tablet; Refill: 1    4. Seasonal allergies  -     cetirizine (zyrTEC) 10 MG tablet; Take 1 tablet by mouth Daily.  Dispense: 30 tablet; Refill: 4    Refuses colonoscopy and refuses mammogram.   She refuses lung ct screening.  She states that she doesn't plan on having anything treated so she is refusing any screenings.     Follow Up:   Next Medicare Wellness visit to be scheduled in 1 year.      An After Visit Summary and PPPS were made available  to the patient.

## 2023-05-19 NOTE — PATIENT INSTRUCTIONS
Continue current plan of care as discussed.   Take medication as ordered (if applicable).    Practice good sleep hygiene.  Eat a well balanced diet with fresh fruit and vegetables.    Stay hydrated, drink water daily.      Limit sodium: salt, seasoned salt, garlic salt, onion salt, celery salt, etc.   Limit sweetened beverages, sodas, juices.    Bake, boil, broil or grill your food, avoid eating fried foods.   Regular exercise is important.  Incorporate weight or resistance into your routine.

## 2023-10-09 DIAGNOSIS — J30.2 SEASONAL ALLERGIES: ICD-10-CM

## 2023-10-09 RX ORDER — CETIRIZINE HYDROCHLORIDE 10 MG/1
10 TABLET ORAL DAILY
Qty: 30 TABLET | Refills: 4 | Status: SHIPPED | OUTPATIENT
Start: 2023-10-09

## 2023-10-10 ENCOUNTER — HOSPITAL ENCOUNTER (OUTPATIENT)
Dept: GENERAL RADIOLOGY | Facility: HOSPITAL | Age: 58
Discharge: HOME | End: 2023-10-10
Payer: MEDICARE

## 2023-10-10 ENCOUNTER — OFFICE VISIT (OUTPATIENT)
Dept: FAMILY MEDICINE CLINIC | Facility: CLINIC | Age: 58
End: 2023-10-10
Payer: MEDICARE

## 2023-10-10 VITALS
OXYGEN SATURATION: 92 % | DIASTOLIC BLOOD PRESSURE: 76 MMHG | HEART RATE: 85 BPM | TEMPERATURE: 97.9 F | RESPIRATION RATE: 18 BRPM | SYSTOLIC BLOOD PRESSURE: 116 MMHG | WEIGHT: 193 LBS | HEIGHT: 63 IN | BODY MASS INDEX: 34.2 KG/M2

## 2023-10-10 DIAGNOSIS — R09.89 DECREASED BREATH SOUNDS OF BOTH LUNGS: ICD-10-CM

## 2023-10-10 DIAGNOSIS — J06.9 UPPER RESPIRATORY TRACT INFECTION, UNSPECIFIED TYPE: Primary | ICD-10-CM

## 2023-10-10 DIAGNOSIS — R53.83 OTHER FATIGUE: ICD-10-CM

## 2023-10-10 DIAGNOSIS — J30.2 SEASONAL ALLERGIES: ICD-10-CM

## 2023-10-10 DIAGNOSIS — F17.200 HEAVY TOBACCO SMOKER: ICD-10-CM

## 2023-10-10 DIAGNOSIS — R05.1 ACUTE COUGH: ICD-10-CM

## 2023-10-10 DIAGNOSIS — M79.10 MYALGIA: ICD-10-CM

## 2023-10-10 DIAGNOSIS — J02.9 SORE THROAT: ICD-10-CM

## 2023-10-10 DIAGNOSIS — J39.2 SWELLING OF THROAT: ICD-10-CM

## 2023-10-10 DIAGNOSIS — R07.89 LEFT-SIDED CHEST WALL PAIN: ICD-10-CM

## 2023-10-10 DIAGNOSIS — Z13.1 SCREENING FOR DIABETES MELLITUS: ICD-10-CM

## 2023-10-10 DIAGNOSIS — J01.40 ACUTE NON-RECURRENT PANSINUSITIS: ICD-10-CM

## 2023-10-10 DIAGNOSIS — R79.9 ABNORMAL FINDING OF BLOOD CHEMISTRY, UNSPECIFIED: ICD-10-CM

## 2023-10-10 DIAGNOSIS — R06.89 ADVENTITIOUS BREATH SOUNDS: ICD-10-CM

## 2023-10-10 DIAGNOSIS — H10.32 ACUTE CONJUNCTIVITIS OF LEFT EYE, UNSPECIFIED ACUTE CONJUNCTIVITIS TYPE: ICD-10-CM

## 2023-10-10 LAB
EXPIRATION DATE: NORMAL
EXPIRATION DATE: NORMAL
FLUAV AG UPPER RESP QL IA.RAPID: NOT DETECTED
FLUBV AG UPPER RESP QL IA.RAPID: NOT DETECTED
INTERNAL CONTROL: NORMAL
INTERNAL CONTROL: NORMAL
Lab: NORMAL
Lab: NORMAL
S PYO AG THROAT QL: NEGATIVE
SARS-COV-2 AG UPPER RESP QL IA.RAPID: NOT DETECTED

## 2023-10-10 PROCEDURE — 96372 THER/PROPH/DIAG INJ SC/IM: CPT

## 2023-10-10 PROCEDURE — 71046 X-RAY EXAM CHEST 2 VIEWS: CPT

## 2023-10-10 PROCEDURE — 99214 OFFICE O/P EST MOD 30 MIN: CPT

## 2023-10-10 PROCEDURE — 87880 STREP A ASSAY W/OPTIC: CPT

## 2023-10-10 PROCEDURE — 87428 SARSCOV & INF VIR A&B AG IA: CPT

## 2023-10-10 RX ORDER — CEPHALEXIN 500 MG/1
500 CAPSULE ORAL 3 TIMES DAILY
Qty: 30 CAPSULE | Refills: 0 | Status: SHIPPED | OUTPATIENT
Start: 2023-10-10 | End: 2023-10-20

## 2023-10-10 RX ORDER — POLYMYXIN B SULFATE AND TRIMETHOPRIM 1; 10000 MG/ML; [USP'U]/ML
1 SOLUTION OPHTHALMIC EVERY 4 HOURS
Qty: 10 ML | Refills: 0 | Status: SHIPPED | OUTPATIENT
Start: 2023-10-10

## 2023-10-10 RX ORDER — METHYLPREDNISOLONE SODIUM SUCCINATE 40 MG/ML
60 INJECTION, POWDER, LYOPHILIZED, FOR SOLUTION INTRAMUSCULAR; INTRAVENOUS ONCE
Status: COMPLETED | OUTPATIENT
Start: 2023-10-10 | End: 2023-10-10

## 2023-10-10 RX ORDER — PREDNISONE 20 MG/1
20 TABLET ORAL 2 TIMES DAILY
Qty: 10 TABLET | Refills: 0 | Status: SHIPPED | OUTPATIENT
Start: 2023-10-10 | End: 2023-10-15

## 2023-10-10 RX ORDER — GUAIFENESIN 200 MG/10ML
200 LIQUID ORAL 3 TIMES DAILY PRN
Qty: 118 ML | Refills: 0 | Status: SHIPPED | OUTPATIENT
Start: 2023-10-10

## 2023-10-10 RX ADMIN — METHYLPREDNISOLONE SODIUM SUCCINATE 60 MG: 40 INJECTION, POWDER, LYOPHILIZED, FOR SOLUTION INTRAMUSCULAR; INTRAVENOUS at 16:02

## 2023-10-10 NOTE — PATIENT INSTRUCTIONS
Please have blood work completed.  Orders are at [facility].      Chest x ray     Continue plan of care as discussed.

## 2023-10-10 NOTE — PROGRESS NOTES
Office Note     Name: Judy Sosa    : 1965     MRN: [id number]     Chief Complaint  Sore Throat and Conjunctivitis    Subjective     Judy Sosa presents to [facility] for an acute complaint of Sore throat     History of Present Illness  Patient is here today with complaints of sore throat, body aches, pink eye. She has not checked for a fever. She has not been around anyone that has been sick that she knows of.   Sore Throat   The current episode started in the past 7 days. The problem has been gradually worsening. Neither side of throat is experiencing more pain than the other. There has been no fever. Associated symptoms include congestion, coughing, ear pain, shortness of breath, swollen glands and trouble swallowing. Pertinent negatives include no abdominal pain, diarrhea or vomiting.   Conjunctivitis   The current episode started 5 to 7 days ago. The onset was gradual. The problem has been gradually worsening. Associated symptoms include eye itching, congestion, ear pain, rhinorrhea, sore throat, swollen glands, cough, eye discharge, eye pain and eye redness. Pertinent negatives include no fever, no double vision, no photophobia, no abdominal pain, no constipation, no diarrhea, no nausea, no vomiting, no mouth sores and no wheezing. The eye pain is mild. The left eye is affected.       The patient presents with the complaint of pink eye, sore throat, productive cough and body aches.    She is negative for influenza, strep an COVID-19 all negative.     She has a significant psychiatric history for paranoid schizophrenia.    She has had pink eye for 3 days and is getting progressively worse.   She denies injuring or damaging the eye.   She denies any foreign objects or scratching the eye.   It is itchy with beige-white pus discharge and it is crusted over when she wakes up.       She quit taking Zyrtec for her allergies.    She is experiencing ear pressure and  popping.   She denies any sinus pressure.   She has had headaches.     Respiratory:  She has a cough with green phlegm.   She has wheezing and dyspnea.   The left side of her chest is painful due to frequent coughing.  She does not have a rescue inhaler at home.   Her oxygen level is low at 92% today on room air.  At the last visit she was 94%.   She smokes approximately 2 packs/day and has for years.  She reports she will work on quitting smoking.  She was strongly encouraged to do so and interventions were discussed.  According to the patient her throat opens and close frequently.   She has had trouble breathing in and out.  She does not see a pulmonologist.   She declined an Albuterol inhaler for fear of throat closing.  She reports that if her throat closes up she is fine with that she does not want any further intervention.    Long discussion was given regarding pneumonia and chest x-ray.  Patient does agree to obtain chest x-ray and will take medications for infection as well as steroids.  She cannot take Mucinex and is requesting guaifenesin only be prescribed.      She reports she is experiencing edema in her ankles and feet, but not severe.   She takes a water pill.    She reports she does not take potassium.    She denies any problems with urinating.         Current Outpatient Medications:     Bioflavonoid Products (Vitamin C Plus) 1000 MG tablet, Take 1 tablet by mouth Daily., Disp: , Rfl:     hydroCHLOROthiazide (HYDRODIURIL) 25 MG tablet, Take 1 tablet by mouth Daily., Disp: 90 tablet, Rfl: 1    OLANZapine (zyPREXA) 10 MG tablet, ZYPREXA 10 MG TABS, Disp: , Rfl:     propranolol (INDERAL) 20 MG tablet, PROPRANOLOL HCL 20 MG TABS, Disp: , Rfl:     simvastatin (ZOCOR) 20 MG tablet, Take 1 tablet by mouth every night at bedtime., Disp: 90 tablet, Rfl: 1    topiramate (TOPAMAX) 200 MG tablet, TOPAMAX 200 MG TABS, Disp: , Rfl:     cephalexin (Keflex) 500 MG capsule, Take 1 capsule by mouth 3 (Three) Times a  Day for 10 days., Disp: 30 capsule, Rfl: 0    cetirizine (zyrTEC) 10 MG tablet, TAKE 1 TABLET BY MOUTH EVERY DAY (Patient not taking: Reported on 10/10/2023), Disp: 30 tablet, Rfl: 4    guaifenesin (ROBITUSSIN) 100 MG/5ML liquid, Take 10 mL by mouth 3 (Three) Times a Day As Needed for Cough., Disp: 118 mL, Rfl: 0    potassium chloride (K-DUR,KLOR-CON) 20 MEQ CR tablet, Take 2 tablets by mouth Daily. (Patient not taking: Reported on 10/10/2023), Disp: 180 tablet, Rfl: 1    predniSONE (DELTASONE) 20 MG tablet, Take 1 tablet by mouth 2 (Two) Times a Day for 5 days., Disp: 10 tablet, Rfl: 0    trimethoprim-polymyxin b (Polytrim) 19124-9.1 UNIT/ML-% ophthalmic solution, Administer 1 drop to both eyes Every 4 (Four) Hours., Disp: 10 mL, Rfl: 0  No current facility-administered medications for this visit.    Allergies   Allergen Reactions    Amoxicillin Rash    Clindamycin Hcl Unknown (See Comments)    Penicillin G Rash    Sulfa Antibiotics Rash       Past Surgical History:   Procedure Laterality Date    APPENDECTOMY          Family History   Problem Relation Age of Onset    Lung cancer Father     Diabetes Father     Diabetes Paternal Grandfather             5/19/2023     1:36 PM   PHQ-2/PHQ-9 Depression Screening   Little Interest or Pleasure in Doing Things 0-->not at all   Feeling Down, Depressed or Hopeless 0-->not at all   PHQ-9: Brief Depression Severity Measure Score 0       Review of Systems   Constitutional:  Positive for activity change and fatigue. Negative for appetite change, chills and fever.   HENT:  Positive for congestion, dental problem, ear pain, rhinorrhea, sinus pressure, sore throat, swollen glands and trouble swallowing. Negative for mouth sores, postnasal drip, tinnitus and voice change.    Eyes:  Positive for pain, discharge, redness and itching. Negative for blurred vision, double vision, photophobia and visual disturbance.   Respiratory:  Positive for cough and shortness of breath. Negative for  "chest tightness (Muscular wall left chest) and wheezing.    Cardiovascular:  Negative for chest pain, palpitations and leg swelling.   Gastrointestinal:  Negative for abdominal pain, anal bleeding, blood in stool, constipation, diarrhea, nausea, vomiting, GERD and indigestion.   Allergic/Immunologic: Positive for environmental allergies.   Neurological:  Negative for dizziness, weakness, light-headedness, headache and confusion.   Psychiatric/Behavioral:  The patient is nervous/anxious.        Objective     /76 (BP Location: Left arm, Patient Position: Sitting, Cuff Size: Large Adult)   Pulse 85   Temp 97.9 øF (36.6 øC)   Resp 18   Ht 160 cm (62.99\")   Wt 87.5 kg (193 lb)   SpO2 92%   BMI 34.20 kg/mý     BP Readings from Last 2 Encounters:   10/10/23 116/76   05/19/23 119/70       Wt Readings from Last 2 Encounters:   10/10/23 87.5 kg (193 lb)   05/19/23 89.8 kg (198 lb)       Class 2 Severe Obesity (BMI >=35 and <=39.9). Obesity-related health conditions include the following: hypertension, dyslipidemias, and GERD. Obesity is unchanged. BMI is  Not discussed at this time .        Physical Exam  Vitals and nursing note reviewed.   Constitutional:       General: She is not in acute distress.     Appearance: Normal appearance. She is obese.      Interventions: She is not intubated.  HENT:      Head: Normocephalic.      Right Ear: Tympanic membrane, ear canal and external ear normal. There is no impacted cerumen.      Left Ear: Tympanic membrane, ear canal and external ear normal. There is no impacted cerumen.      Nose: Rhinorrhea present.      Right Sinus: Maxillary sinus tenderness and frontal sinus tenderness present.      Left Sinus: Maxillary sinus tenderness and frontal sinus tenderness present.      Mouth/Throat:      Mouth: Mucous membranes are moist.      Dentition: Dental caries present.      Tongue: No lesions.      Palate: No mass and lesions.      Pharynx: Uvula midline. No posterior " oropharyngeal erythema.   Eyes:      General: Vision grossly intact. No allergic shiner.        Right eye: No foreign body, discharge or hordeolum.         Left eye: Discharge present.No foreign body or hordeolum.      Extraocular Movements: Extraocular movements intact.      Conjunctiva/sclera:      Left eye: Left conjunctiva is injected.      Pupils: Pupils are equal.      Right eye: Pupil is not sluggish.      Left eye: Pupil is not sluggish.      Comments: Frequent tearing noted   Neck:      Thyroid: No thyroid mass, thyromegaly or thyroid tenderness.      Vascular: No carotid bruit.   Cardiovascular:      Rate and Rhythm: Normal rate and regular rhythm.      Pulses: Normal pulses.      Heart sounds: Normal heart sounds.   Pulmonary:      Effort: Pulmonary effort is normal. No tachypnea, bradypnea, accessory muscle usage, prolonged expiration or respiratory distress. She is not intubated.      Breath sounds: Examination of the right-middle field reveals rhonchi. Examination of the left-middle field reveals rhonchi. Examination of the right-lower field reveals rhonchi. Examination of the left-lower field reveals rhonchi. Wheezing and rhonchi present.   Chest:      Chest wall: No tenderness.   Musculoskeletal:      Right lower le+ Edema present.      Left lower le+ Edema present.   Skin:     General: Skin is warm and dry.      Capillary Refill: Capillary refill takes less than 2 seconds.      Coloration: Skin is not ashen, cyanotic or pale.   Neurological:      Mental Status: She is alert and oriented to person, place, and time.   Psychiatric:         Attention and Perception: Attention and perception normal.         Mood and Affect: Mood is anxious.         Speech: Speech normal.         Behavior: Behavior normal. Behavior is cooperative.         Thought Content: Thought content is paranoid.         Cognition and Memory: Cognition and memory normal.         Judgment: Judgment normal.       Physical Exam    EENT     Eye comments: Frequent tearing noted    Result Review :{ Labs  Result Review  Imaging  Med Tab  Media   XR Chest PA & Lateral  Narrative: XR CHEST PA AND LATERAL    Date of Exam: 10/10/2023 4:14 PM EDT    Indication: chest wall pain, productive cough    Comparison: 10/13/2022    Findings:  Cardiomediastinal silhouette is unchanged. No focal consolidation or overt pulmonary edema. No pleural effusion or pneumothorax. Osseous structures are unchanged.  Impression: Impression:  No evidence of acute cardiopulmonary disease.    Electronically Signed: Allen Camejo MD    10/10/2023 4:19 PM EDT    Workstation ID: [id number]         Assessment and Plan         Problems Addressed this Visit          Active Problems    Cough    Relevant Medications    cephalexin (Keflex) 500 MG capsule    guaifenesin (ROBITUSSIN) 100 MG/5ML liquid    predniSONE (DELTASONE) 20 MG tablet    methylPREDNISolone sodium succinate (SOLU-Medrol) injection 60 mg (Completed)    Other Relevant Orders    XR Chest PA & Lateral (Completed)    Seasonal allergies    Heavy tobacco smoker    Fatigue    Relevant Medications    cephalexin (Keflex) 500 MG capsule    Other Relevant Orders    PTH, Intact & Calcium    TSH Rfx On Abnormal To Free T4    Hemoglobin A1c    Adventitious breath sounds    Relevant Medications    cephalexin (Keflex) 500 MG capsule    predniSONE (DELTASONE) 20 MG tablet    methylPREDNISolone sodium succinate (SOLU-Medrol) injection 60 mg (Completed)     Other Visit Diagnoses       Upper respiratory tract infection, unspecified type    -  Primary    Relevant Medications    cephalexin (Keflex) 500 MG capsule    predniSONE (DELTASONE) 20 MG tablet    methylPREDNISolone sodium succinate (SOLU-Medrol) injection 60 mg (Completed)    Other Relevant Orders    POCT rapid strep A (Completed)    POCT SARS-CoV-2 Antigen KATJA + Flu (Completed)    XR Chest PA & Lateral (Completed)    Sore throat        Relevant Orders    POCT rapid strep A  (Completed)    POCT SARS-CoV-2 Antigen KATJA + Flu (Completed)    Acute conjunctivitis of left eye, unspecified acute conjunctivitis type        Relevant Medications    trimethoprim-polymyxin b (Polytrim) 39405-2.1 UNIT/ML-% ophthalmic solution    Left-sided chest wall pain        Relevant Orders    XR Chest PA & Lateral (Completed)    Swelling of throat        reports the other day it occurred. Refuses albuterol inhaler due to concern of worry of throat closing up    Acute non-recurrent pansinusitis        Relevant Medications    cephalexin (Keflex) 500 MG capsule    methylPREDNISolone sodium succinate (SOLU-Medrol) injection 60 mg (Completed)    Other Relevant Orders    CBC & Differential    Decreased breath sounds of both lungs        Relevant Medications    cephalexin (Keflex) 500 MG capsule    predniSONE (DELTASONE) 20 MG tablet    methylPREDNISolone sodium succinate (SOLU-Medrol) injection 60 mg (Completed)    Other Relevant Orders    XR Chest PA & Lateral (Completed)    Screening for diabetes mellitus        Relevant Orders    CBC & Differential    Comprehensive Metabolic Panel    Hemoglobin A1c    Myalgia        Relevant Orders    PTH, Intact & Calcium    TSH Rfx On Abnormal To Free T4    Hemoglobin A1c    Abnormal finding of blood chemistry, unspecified        Relevant Orders    Hemoglobin A1c          Diagnoses         Codes Comments    Upper respiratory tract infection, unspecified type    -  Primary ICD-10-CM: J06.9  ICD-9-CM: 465.9     Sore throat     ICD-10-CM: J02.9  ICD-9-CM: 462     Acute conjunctivitis of left eye, unspecified acute conjunctivitis type     ICD-10-CM: H10.32  ICD-9-CM: 372.00     Left-sided chest wall pain     ICD-10-CM: R07.89  ICD-9-CM: 786.52     Acute cough     ICD-10-CM: R05.1  ICD-9-CM: 786.2     Heavy tobacco smoker     ICD-10-CM: F17.200  ICD-9-CM: 305.1 Smokes 2 ppd. Pt reports going to quit cold turkey    Swelling of throat     ICD-10-CM: J39.2  ICD-9-CM: 478.25 reports the  other day it occurred. Refuses albuterol inhaler due to concern of worry of throat closing up    Acute non-recurrent pansinusitis     ICD-10-CM: J01.40  ICD-9-CM: 461.8     Seasonal allergies     ICD-10-CM: J30.2  ICD-9-CM: 477.9     Adventitious breath sounds     ICD-10-CM: R06.89  ICD-9-CM: 786.00     Decreased breath sounds of both lungs     ICD-10-CM: R09.89  ICD-9-CM: 786.9     Screening for diabetes mellitus     ICD-10-CM: Z13.1  ICD-9-CM: V77.1     Other fatigue     ICD-10-CM: R53.83  ICD-9-CM: 780.79     Myalgia     ICD-10-CM: M79.10  ICD-9-CM: 729.1     Abnormal finding of blood chemistry, unspecified     ICD-10-CM: R79.9  ICD-9-CM: 790.6            Procedures    Problem List Items Addressed This Visit          Active Problems    Cough    Relevant Medications    cephalexin (Keflex) 500 MG capsule    guaifenesin (ROBITUSSIN) 100 MG/5ML liquid    predniSONE (DELTASONE) 20 MG tablet    methylPREDNISolone sodium succinate (SOLU-Medrol) injection 60 mg (Completed)    Other Relevant Orders    XR Chest PA & Lateral (Completed)    Seasonal allergies    Heavy tobacco smoker    Fatigue    Relevant Medications    cephalexin (Keflex) 500 MG capsule    Other Relevant Orders    PTH, Intact & Calcium    TSH Rfx On Abnormal To Free T4    Hemoglobin A1c    Adventitious breath sounds    Relevant Medications    cephalexin (Keflex) 500 MG capsule    predniSONE (DELTASONE) 20 MG tablet    methylPREDNISolone sodium succinate (SOLU-Medrol) injection 60 mg (Completed)     Other Visit Diagnoses       Upper respiratory tract infection, unspecified type    -  Primary    Relevant Medications    cephalexin (Keflex) 500 MG capsule    predniSONE (DELTASONE) 20 MG tablet    methylPREDNISolone sodium succinate (SOLU-Medrol) injection 60 mg (Completed)    Other Relevant Orders    POCT rapid strep A (Completed)    POCT SARS-CoV-2 Antigen KATJA + Flu (Completed)    XR Chest PA & Lateral (Completed)    Sore throat        Relevant Orders    POCT  rapid strep A (Completed)    POCT SARS-CoV-2 Antigen KATJA + Flu (Completed)    Acute conjunctivitis of left eye, unspecified acute conjunctivitis type        Relevant Medications    trimethoprim-polymyxin b (Polytrim) 33713-1.1 UNIT/ML-% ophthalmic solution    Left-sided chest wall pain        Relevant Orders    XR Chest PA & Lateral (Completed)    Swelling of throat        reports the other day it occurred. Refuses albuterol inhaler due to concern of worry of throat closing up    Acute non-recurrent pansinusitis        Relevant Medications    cephalexin (Keflex) 500 MG capsule    methylPREDNISolone sodium succinate (SOLU-Medrol) injection 60 mg (Completed)    Other Relevant Orders    CBC & Differential    Decreased breath sounds of both lungs        Relevant Medications    cephalexin (Keflex) 500 MG capsule    predniSONE (DELTASONE) 20 MG tablet    methylPREDNISolone sodium succinate (SOLU-Medrol) injection 60 mg (Completed)    Other Relevant Orders    XR Chest PA & Lateral (Completed)    Screening for diabetes mellitus        Relevant Orders    CBC & Differential    Comprehensive Metabolic Panel    Hemoglobin A1c    Myalgia        Relevant Orders    PTH, Intact & Calcium    TSH Rfx On Abnormal To Free T4    Hemoglobin A1c    Abnormal finding of blood chemistry, unspecified        Relevant Orders    Hemoglobin A1c                 Wrapup Tab  Return for Follow up with primary care provider as needed..     Patient Instructions   Please have blood work completed.  Orders are at [facility].      Chest x ray     Continue plan of care as discussed.    Patient was given instructions and counseling regarding her condition or for health maintenance advice. Please see specific information pulled into the AVS if appropriate.  Hand hygiene was performed during entrance to exam room and following assessment of patient. This document is intended for medical expert use only.     EMR Dragon/Transcription disclaimer:   Much of this  encounter note is an electronic transcription/translation of spoken language to printed text. The electronic translation of spoken language may permit erroneous, or at times, nonsensical words or phrases to be inadvertently transcribed.      TIERRA Mckeon, FNP-C  [address]  [facility]  [address] [place] [address] [address]  [place] [address] [phone]    Transcribed from ambient dictation for TIERRA Mckeon by Danette Durbin.  10/10/23   17:47 EDT    Patient or patient representative verbalized consent to the visit recording.  I have personally performed the services described in this document as transcribed by the above individual, and it is both accurate and complete.

## 2023-10-11 LAB
ALBUMIN SERPL-MCNC: 4.5 G/DL (ref 3.8–4.9)
ALBUMIN/GLOB SERPL: 1.5 {RATIO} (ref 1.2–2.2)
ALP SERPL-CCNC: 120 IU/L (ref 44–121)
ALT SERPL-CCNC: 17 IU/L (ref 0–32)
AST SERPL-CCNC: 14 IU/L (ref 0–40)
BASOPHILS # BLD AUTO: 0 X10E3/UL (ref 0–0.2)
BASOPHILS NFR BLD AUTO: 0 %
BILIRUB SERPL-MCNC: 0.3 MG/DL (ref 0–1.2)
BUN SERPL-MCNC: 12 MG/DL (ref 6–24)
BUN/CREAT SERPL: 13 (ref 9–23)
CALCIUM SERPL-MCNC: 8.5 MG/DL (ref 8.7–10.2)
CHLORIDE SERPL-SCNC: 99 MMOL/L (ref 96–106)
CO2 SERPL-SCNC: 25 MMOL/L (ref 20–29)
CREAT SERPL-MCNC: 0.95 MG/DL (ref 0.57–1)
EGFRCR SERPLBLD CKD-EPI 2021: 69 ML/MIN/1.73
EOSINOPHIL # BLD AUTO: 0.4 X10E3/UL (ref 0–0.4)
EOSINOPHIL NFR BLD AUTO: 3 %
ERYTHROCYTE [DISTWIDTH] IN BLOOD BY AUTOMATED COUNT: 12.5 % (ref 11.7–15.4)
GLOBULIN SER CALC-MCNC: 3 G/DL (ref 1.5–4.5)
GLUCOSE SERPL-MCNC: 102 MG/DL (ref 70–99)
HBA1C MFR BLD: 6.3 % (ref 4.8–5.6)
HCT VFR BLD AUTO: 44.9 % (ref 34–46.6)
HGB BLD-MCNC: 15.3 G/DL (ref 11.1–15.9)
IMM GRANULOCYTES # BLD AUTO: 0 X10E3/UL (ref 0–0.1)
IMM GRANULOCYTES NFR BLD AUTO: 0 %
INTACT PTH: NORMAL
LYMPHOCYTES # BLD AUTO: 3.4 X10E3/UL (ref 0.7–3.1)
LYMPHOCYTES NFR BLD AUTO: 23 %
MCH RBC QN AUTO: 30.5 PG (ref 26.6–33)
MCHC RBC AUTO-ENTMCNC: 34.1 G/DL (ref 31.5–35.7)
MCV RBC AUTO: 90 FL (ref 79–97)
MONOCYTES # BLD AUTO: 0.7 X10E3/UL (ref 0.1–0.9)
MONOCYTES NFR BLD AUTO: 5 %
NEUTROPHILS # BLD AUTO: 9.9 X10E3/UL (ref 1.4–7)
NEUTROPHILS NFR BLD AUTO: 69 %
PLATELET # BLD AUTO: 273 X10E3/UL (ref 150–450)
POTASSIUM SERPL-SCNC: 3.3 MMOL/L (ref 3.5–5.2)
PROT SERPL-MCNC: 7.5 G/DL (ref 6–8.5)
PTH-INTACT SERPL-MCNC: 21 PG/ML (ref 15–65)
RBC # BLD AUTO: 5.01 X10E6/UL (ref 3.77–5.28)
SODIUM SERPL-SCNC: 145 MMOL/L (ref 134–144)
TSH SERPL DL<=0.005 MIU/L-ACNC: 2.78 UIU/ML (ref 0.45–4.5)
WBC # BLD AUTO: 14.4 X10E3/UL (ref 3.4–10.8)

## 2023-10-12 ENCOUNTER — TELEPHONE (OUTPATIENT)
Dept: FAMILY MEDICINE CLINIC | Facility: CLINIC | Age: 58
End: 2023-10-12

## 2023-10-12 NOTE — TELEPHONE ENCOUNTER
Caller: Judy Sosa    Relationship to patient: Self    Best call back number: [phone]    Patient is needing:     CALLING TO GET TEST RESULTS FOR XRAY AND LAB RESULTS.

## 2023-10-13 NOTE — TELEPHONE ENCOUNTER
Caller: Judy Sosa    Relationship: Self    Best call back number:      [phone]       PATIENT IS CHECKING ON THE STATUS OF THIS REQUEST. PLEASE CALL AND ADVISE.

## 2023-10-17 ENCOUNTER — TELEPHONE (OUTPATIENT)
Dept: FAMILY MEDICINE CLINIC | Facility: CLINIC | Age: 58
End: 2023-10-17
Payer: MEDICARE

## 2023-10-17 DIAGNOSIS — D72.820 LYMPHOCYTOSIS: Primary | ICD-10-CM

## 2023-10-17 NOTE — TELEPHONE ENCOUNTER
Caller: Judy Sosa    Relationship: Self    Best call back number: [phone]     What test was performed: X RAY AND BLOOD WORK    When was the test performed: 10.10.23      Additional notes: PATIENT IS REQUESTING A CALL FROM CLINICAL TO DISCUSS HER LUNG XRAY AND BLOOD WORK RESULTS    PLEASE ADVISE     PATIENT STATED SHE CAN NOT BE REACHED UNTIL AFTER 4 PM

## 2023-10-17 NOTE — TELEPHONE ENCOUNTER
Patient called for results but TATUM Mandel is with patients. Informed patient Tequila will return her call after seeing patients today.

## 2023-10-24 ENCOUNTER — TELEPHONE (OUTPATIENT)
Dept: FAMILY MEDICINE CLINIC | Facility: CLINIC | Age: 58
End: 2023-10-24
Payer: MEDICARE

## 2023-10-24 NOTE — TELEPHONE ENCOUNTER
Caller: Judy Sosa    Relationship: Self    Best call back number: [phone]     What medication are you requesting: MEDICATION TO TREAT SYMPTOMS    What are your current symptoms: COUGH, CONGESTION, SORE THROAT    How long have you been experiencing symptoms: 2 WEEKS    Have you had these symptoms before:    [] Yes  [] No    Have you been treated for these symptoms before:   [] Yes  [] No    If a prescription is needed, what is your preferred pharmacy and phone number: [id number] - [place], [place] - [address] - [phone] [facility] [phone] FX     Additional notes: PATIENT STATED SHE HAS BEEN TAKING OVER THE COUNTER MEDICATION AND IT IS NOT HELPING HER SYMPTOMS    PLEASE ADVISE

## 2023-10-26 RX ORDER — AZITHROMYCIN 250 MG/1
TABLET, FILM COATED ORAL
Qty: 6 TABLET | Refills: 0 | Status: SHIPPED | OUTPATIENT
Start: 2023-10-26

## 2023-10-26 RX ORDER — ALBUTEROL SULFATE 90 UG/1
2 AEROSOL, METERED RESPIRATORY (INHALATION) EVERY 4 HOURS PRN
Qty: 6.7 G | Refills: 0 | Status: SHIPPED | OUTPATIENT
Start: 2023-10-26

## 2023-11-02 DIAGNOSIS — H10.32 ACUTE CONJUNCTIVITIS OF LEFT EYE, UNSPECIFIED ACUTE CONJUNCTIVITIS TYPE: ICD-10-CM

## 2023-11-02 DIAGNOSIS — D72.820 LYMPHOCYTOSIS: ICD-10-CM

## 2023-11-02 DIAGNOSIS — R60.0 LOCALIZED EDEMA: ICD-10-CM

## 2023-11-02 DIAGNOSIS — D72.9 NEUTROPHILIC LEUKOCYTOSIS: Primary | ICD-10-CM

## 2023-11-02 DIAGNOSIS — E78.01 FAMILIAL HYPERCHOLESTEROLEMIA: ICD-10-CM

## 2023-11-02 PROBLEM — D72.828 NEUTROPHILIC LEUKOCYTOSIS: Status: ACTIVE | Noted: 2017-12-14

## 2023-11-02 RX ORDER — SIMVASTATIN 20 MG
20 TABLET ORAL
Qty: 90 TABLET | Refills: 1 | Status: SHIPPED | OUTPATIENT
Start: 2023-11-02

## 2023-11-02 RX ORDER — HYDROCHLOROTHIAZIDE 25 MG/1
25 TABLET ORAL DAILY
Qty: 90 TABLET | Refills: 1 | Status: SHIPPED | OUTPATIENT
Start: 2023-11-02

## 2023-11-02 RX ORDER — POLYMYXIN B SULFATE AND TRIMETHOPRIM 1; 10000 MG/ML; [USP'U]/ML
SOLUTION OPHTHALMIC
Qty: 10 ML | Refills: 0 | OUTPATIENT
Start: 2023-11-02

## 2023-11-03 NOTE — TELEPHONE ENCOUNTER
She may need to be seen for evaluation of her eye.  She needs eyedrops 3 weeks later  - then the medication was not be effective.    Also please let her know I have placed orders to have a peripheral blood smear completed due to some abnormal blood work.

## 2023-11-30 DIAGNOSIS — H10.32 ACUTE CONJUNCTIVITIS OF LEFT EYE, UNSPECIFIED ACUTE CONJUNCTIVITIS TYPE: ICD-10-CM

## 2023-11-30 RX ORDER — POLYMYXIN B SULFATE AND TRIMETHOPRIM 1; 10000 MG/ML; [USP'U]/ML
SOLUTION OPHTHALMIC
Qty: 10 ML | Refills: 0 | OUTPATIENT
Start: 2023-11-30

## 2024-02-05 DIAGNOSIS — R60.0 LOCALIZED EDEMA: ICD-10-CM

## 2024-02-05 RX ORDER — POTASSIUM CHLORIDE 1500 MG/1
40 TABLET, EXTENDED RELEASE ORAL DAILY
Qty: 180 TABLET | Refills: 0 | Status: SHIPPED | OUTPATIENT
Start: 2024-02-05

## 2024-03-01 DIAGNOSIS — H10.32 ACUTE CONJUNCTIVITIS OF LEFT EYE, UNSPECIFIED ACUTE CONJUNCTIVITIS TYPE: ICD-10-CM

## 2024-03-01 DIAGNOSIS — J30.2 SEASONAL ALLERGIES: ICD-10-CM

## 2024-03-03 RX ORDER — POLYMYXIN B SULFATE AND TRIMETHOPRIM 1; 10000 MG/ML; [USP'U]/ML
SOLUTION OPHTHALMIC
Qty: 10 ML | Refills: 0 | OUTPATIENT
Start: 2024-03-03

## 2024-03-03 RX ORDER — CETIRIZINE HYDROCHLORIDE 10 MG/1
10 TABLET ORAL DAILY
Qty: 30 TABLET | Refills: 11 | Status: SHIPPED | OUTPATIENT
Start: 2024-03-03

## 2024-03-04 NOTE — TELEPHONE ENCOUNTER
Polytrim is not a medication I went to continually prescribed if you have allergies to your eyes I recommend Pataday.

## 2024-03-27 DIAGNOSIS — H10.32 ACUTE CONJUNCTIVITIS OF LEFT EYE, UNSPECIFIED ACUTE CONJUNCTIVITIS TYPE: ICD-10-CM

## 2024-03-28 RX ORDER — POLYMYXIN B SULFATE AND TRIMETHOPRIM 1; 10000 MG/ML; [USP'U]/ML
SOLUTION OPHTHALMIC
Qty: 10 ML | Refills: 0 | OUTPATIENT
Start: 2024-03-28

## 2024-04-02 ENCOUNTER — TELEPHONE (OUTPATIENT)
Dept: FAMILY MEDICINE CLINIC | Facility: CLINIC | Age: 59
End: 2024-04-02
Payer: MEDICARE

## 2024-04-02 NOTE — TELEPHONE ENCOUNTER
Pt states she is unable to take potassium pills.  States she is needing pills that she is able to chew and swallow if possible.  [facility] uses [facility] [place].

## 2024-04-04 RX ORDER — POTASSIUM CHLORIDE 1500 MG/1
20 TABLET, EXTENDED RELEASE ORAL 2 TIMES DAILY
Qty: 180 TABLET | Refills: 0 | Status: SHIPPED | OUTPATIENT
Start: 2024-04-04 | End: 2024-07-03

## 2024-05-23 DIAGNOSIS — R60.0 LOCALIZED EDEMA: ICD-10-CM

## 2024-05-23 DIAGNOSIS — H10.32 ACUTE CONJUNCTIVITIS OF LEFT EYE, UNSPECIFIED ACUTE CONJUNCTIVITIS TYPE: ICD-10-CM

## 2024-05-23 DIAGNOSIS — E78.01 FAMILIAL HYPERCHOLESTEROLEMIA: ICD-10-CM

## 2024-05-24 RX ORDER — HYDROCHLOROTHIAZIDE 25 MG/1
25 TABLET ORAL DAILY
Qty: 90 TABLET | Refills: 1 | Status: SHIPPED | OUTPATIENT
Start: 2024-05-24

## 2024-05-24 RX ORDER — POLYMYXIN B SULFATE AND TRIMETHOPRIM 1; 10000 MG/ML; [USP'U]/ML
SOLUTION OPHTHALMIC
Qty: 10 ML | Refills: 0 | Status: SHIPPED | OUTPATIENT
Start: 2024-05-24

## 2024-05-24 RX ORDER — SIMVASTATIN 20 MG
20 TABLET ORAL
Qty: 90 TABLET | Refills: 1 | Status: SHIPPED | OUTPATIENT
Start: 2024-05-24

## 2024-05-24 RX ORDER — POTASSIUM CHLORIDE 1500 MG/1
20 TABLET, EXTENDED RELEASE ORAL 2 TIMES DAILY
Qty: 180 TABLET | Refills: 0 | Status: SHIPPED | OUTPATIENT
Start: 2024-05-24 | End: 2024-08-22

## 2024-05-28 ENCOUNTER — OFFICE VISIT (OUTPATIENT)
Dept: FAMILY MEDICINE CLINIC | Facility: CLINIC | Age: 59
End: 2024-05-28
Payer: MEDICARE

## 2024-05-28 VITALS
HEIGHT: 63 IN | DIASTOLIC BLOOD PRESSURE: 59 MMHG | HEART RATE: 85 BPM | SYSTOLIC BLOOD PRESSURE: 106 MMHG | WEIGHT: 201.4 LBS | BODY MASS INDEX: 35.68 KG/M2 | OXYGEN SATURATION: 92 % | RESPIRATION RATE: 18 BRPM

## 2024-05-28 DIAGNOSIS — F43.21 GRIEF: ICD-10-CM

## 2024-05-28 DIAGNOSIS — Z13.29 SCREENING FOR THYROID DISORDER: ICD-10-CM

## 2024-05-28 DIAGNOSIS — Z12.31 SCREENING MAMMOGRAM FOR BREAST CANCER: ICD-10-CM

## 2024-05-28 DIAGNOSIS — R60.0 LOCALIZED EDEMA: ICD-10-CM

## 2024-05-28 DIAGNOSIS — Z12.11 SCREENING FOR COLON CANCER: ICD-10-CM

## 2024-05-28 DIAGNOSIS — D72.820 LYMPHOCYTOSIS: ICD-10-CM

## 2024-05-28 DIAGNOSIS — K58.2 IRRITABLE BOWEL SYNDROME WITH BOTH CONSTIPATION AND DIARRHEA: ICD-10-CM

## 2024-05-28 DIAGNOSIS — R07.9 CHEST PAIN, UNSPECIFIED TYPE: ICD-10-CM

## 2024-05-28 DIAGNOSIS — E78.01 FAMILIAL HYPERCHOLESTEROLEMIA: ICD-10-CM

## 2024-05-28 DIAGNOSIS — F20.0 PARANOID SCHIZOPHRENIA: ICD-10-CM

## 2024-05-28 DIAGNOSIS — D72.9 NEUTROPHILIC LEUKOCYTOSIS: ICD-10-CM

## 2024-05-28 DIAGNOSIS — J30.2 SEASONAL ALLERGIES: ICD-10-CM

## 2024-05-28 DIAGNOSIS — F17.200 HEAVY TOBACCO SMOKER: ICD-10-CM

## 2024-05-28 DIAGNOSIS — R73.03 PREDIABETES: ICD-10-CM

## 2024-05-28 DIAGNOSIS — J44.9 CHRONIC OBSTRUCTIVE PULMONARY DISEASE, UNSPECIFIED COPD TYPE: ICD-10-CM

## 2024-05-28 DIAGNOSIS — K21.9 GASTROESOPHAGEAL REFLUX DISEASE WITHOUT ESOPHAGITIS: ICD-10-CM

## 2024-05-28 DIAGNOSIS — Z00.00 ENCOUNTER FOR MEDICARE ANNUAL WELLNESS EXAM: Primary | ICD-10-CM

## 2024-05-28 DIAGNOSIS — E87.6 HYPOKALEMIA: ICD-10-CM

## 2024-05-28 DIAGNOSIS — E78.2 MIXED HYPERLIPIDEMIA: ICD-10-CM

## 2024-05-28 PROBLEM — J06.9 URI, ACUTE: Status: RESOLVED | Noted: 2022-10-13 | Resolved: 2024-05-28

## 2024-05-28 LAB
BILIRUB BLD-MCNC: NEGATIVE MG/DL
CLARITY, POC: CLEAR
COLOR UR: YELLOW
EXPIRATION DATE: ABNORMAL
GLUCOSE UR STRIP-MCNC: NEGATIVE MG/DL
HBA1C MFR BLD: 6.4 % (ref 4.5–5.7)
KETONES UR QL: NEGATIVE
LEUKOCYTE EST, POC: NEGATIVE
Lab: ABNORMAL
NITRITE UR-MCNC: NEGATIVE MG/ML
PH UR: 7 [PH] (ref 5–8)
PROT UR STRIP-MCNC: NEGATIVE MG/DL
RBC # UR STRIP: NEGATIVE /UL
SP GR UR: 1.02 (ref 1–1.03)
UROBILINOGEN UR QL: NORMAL

## 2024-05-28 PROCEDURE — 83036 HEMOGLOBIN GLYCOSYLATED A1C: CPT

## 2024-05-28 PROCEDURE — 99214 OFFICE O/P EST MOD 30 MIN: CPT

## 2024-05-28 PROCEDURE — 1170F FXNL STATUS ASSESSED: CPT

## 2024-05-28 PROCEDURE — 81003 URINALYSIS AUTO W/O SCOPE: CPT

## 2024-05-28 PROCEDURE — 3044F HG A1C LEVEL LT 7.0%: CPT

## 2024-05-28 PROCEDURE — G0439 PPPS, SUBSEQ VISIT: HCPCS

## 2024-05-28 PROCEDURE — 1126F AMNT PAIN NOTED NONE PRSNT: CPT

## 2024-05-28 RX ORDER — SIMVASTATIN 20 MG
20 TABLET ORAL
Qty: 90 TABLET | Refills: 3 | Status: SHIPPED | OUTPATIENT
Start: 2024-05-28

## 2024-05-28 RX ORDER — CETIRIZINE HYDROCHLORIDE 10 MG/1
10 TABLET ORAL DAILY
Qty: 30 TABLET | Refills: 11 | Status: SHIPPED | OUTPATIENT
Start: 2024-05-28

## 2024-05-28 RX ORDER — POTASSIUM CHLORIDE 1500 MG/1
20 TABLET, EXTENDED RELEASE ORAL 2 TIMES DAILY
Qty: 180 TABLET | Refills: 3 | Status: SHIPPED | OUTPATIENT
Start: 2024-05-28

## 2024-05-28 RX ORDER — HYDROCHLOROTHIAZIDE 25 MG/1
25 TABLET ORAL DAILY
Qty: 90 TABLET | Refills: 3 | Status: SHIPPED | OUTPATIENT
Start: 2024-05-28

## 2024-05-28 RX ORDER — ALBUTEROL SULFATE 90 UG/1
2 AEROSOL, METERED RESPIRATORY (INHALATION) EVERY 4 HOURS PRN
Qty: 6.7 G | Refills: 0 | Status: CANCELLED | OUTPATIENT
Start: 2024-05-28

## 2024-05-28 NOTE — PROGRESS NOTES
The ABCs of the Annual Wellness Visit  Subsequent Medicare Wellness Visit    Subjective    Judy Sosa is a 58 y.o. female who presents for a Subsequent Medicare Wellness Visit.    The following portions of the patient's history were reviewed and   updated as appropriate: allergies, current medications, past family history, past medical history, past social history, past surgical history, and problem list.    Compared to one year ago, the patient feels her physical   health is the same.    Compared to one year ago, the patient feels her mental   health is the same.    Recent Hospitalizations:  She was not admitted to the hospital during the last year.       Current Medical Providers:  Patient Care Team:  Joie Luo APRN as PCP - General (Nurse Practitioner)    Outpatient Medications Prior to Visit   Medication Sig Dispense Refill    albuterol sulfate  (90 Base) MCG/ACT inhaler Inhale 2 puffs Every 4 (Four) Hours As Needed for Wheezing. 6.7 g 0    azithromycin (Zithromax Z-Rafael) 250 MG tablet Take 2 tablets by mouth on day 1, then 1 tablet daily on days 2-5 6 tablet 0    Bioflavonoid Products (Vitamin C Plus) 1000 MG tablet Take 1 tablet by mouth Daily.      guaifenesin (ROBITUSSIN) 100 MG/5ML liquid Take 10 mL by mouth 3 (Three) Times a Day As Needed for Cough. 118 mL 0    OLANZapine (zyPREXA) 10 MG tablet ZYPREXA 10 MG TABS      propranolol (INDERAL) 20 MG tablet PROPRANOLOL HCL 20 MG TABS      topiramate (TOPAMAX) 200 MG tablet TOPAMAX 200 MG TABS      trimethoprim-polymyxin b (POLYTRIM) 92680-4.1 UNIT/ML-% ophthalmic solution ADMINISTER 1 DROP TO BOTH EYES EVERY 4 HOURS. 10 mL 0    cetirizine (zyrTEC) 10 MG tablet TAKE 1 TABLET BY MOUTH EVERY DAY 30 tablet 11    hydroCHLOROthiazide 25 MG tablet TAKE 1 TABLET BY MOUTH EVERY DAY 90 tablet 1    potassium chloride ER (K-TAB) 20 MEQ tablet controlled-release ER tablet TAKE 1 TABLET BY MOUTH 2 (TWO) TIMES A DAY FOR 90 DAYS. 180 tablet 0    simvastatin  "(ZOCOR) 20 MG tablet TAKE 1 TABLET BY MOUTH EVERYDAY AT BEDTIME 90 tablet 1     No facility-administered medications prior to visit.       No opioid medication identified on active medication list. I have reviewed chart for other potential  high risk medication/s and harmful drug interactions in the elderly.        Aspirin is not on active medication list.  Aspirin use is contraindicated for this patient due to: history of bleeding.  .    Patient Active Problem List   Diagnosis    Amenorrhea    Chronic obstructive pulmonary disease    Colitis    Cough    Dysmenorrhea    Edema    Neutrophilic leukocytosis    Gastroesophageal reflux disease    Hyperlipidemia    Irritable bowel syndrome    Paranoid schizophrenia    Encounter for subsequent annual wellness visit (AWV) in Medicare patient    COPD with exacerbation    Chest pain    Dermatitis    Seasonal allergies    Familial hypercholesterolemia    Localized edema    Grief    Other constipation    Worried well    Screening mammogram for breast cancer    Heavy tobacco smoker    Screening for colon cancer    Fatigue    Adventitious breath sounds    Chest congestion     Advance Care Planning   Advance Care Planning     Advance Directive is not on file.  ACP discussion was held with the patient during this visit. Patient has an advance directive (not in EMR), copy requested.     Objective    Vitals:    05/28/24 1347   BP: 106/59   BP Location: Left arm   Patient Position: Sitting   Cuff Size: Large Adult   Pulse: 85   Resp: 18   SpO2: 92%   Weight: 91.4 kg (201 lb 6.4 oz)   Height: 160 cm (62.99\")     Estimated body mass index is 35.69 kg/m² as calculated from the following:    Height as of this encounter: 160 cm (62.99\").    Weight as of this encounter: 91.4 kg (201 lb 6.4 oz).    BMI is >= 30 and <35. (Class 1 Obesity). The following options were offered after discussion;: information on healthy weight added to patient's after visit summary       Does the patient have " evidence of cognitive impairment? Yes    Lab Results   Component Value Date    HGBA1C 6.4 (A) 2024        HEALTH RISK ASSESSMENT    Smoking Status:  Social History     Tobacco Use   Smoking Status Every Day    Current packs/day: 1.00    Average packs/day: 1 pack/day for 38.0 years (38.0 ttl pk-yrs)    Types: Cigarettes   Smokeless Tobacco Never     Alcohol Consumption:  Social History     Substance and Sexual Activity   Alcohol Use No     Fall Risk Screen:    ANALIADI Fall Risk Assessment was completed, and patient is at MODERATE risk for falls. Assessment completed on:2024    Depression Screenin/28/2024     1:43 PM   PHQ-2/PHQ-9 Depression Screening   Little Interest or Pleasure in Doing Things 0-->not at all   Feeling Down, Depressed or Hopeless 1-->several days   PHQ-9: Brief Depression Severity Measure Score 1       Health Habits and Functional and Cognitive Screenin/28/2024     1:36 PM   Functional & Cognitive Status   Do you have difficulty preparing food and eating? Yes   Do you have difficulty bathing yourself, getting dressed or grooming yourself? Yes   Do you have difficulty using the toilet? Yes   Do you have difficulty moving around from place to place? No   Do you have trouble with steps or getting out of a bed or a chair? Yes   Current Diet Other   Dental Exam Not up to date   Eye Exam Not up to date   Exercise (times per week) 0 times per week   Current Exercises Include No Regular Exercise   Do you need help using the phone?  No   Are you deaf or do you have serious difficulty hearing?  No   Do you need help to go to places out of walking distance? No   Do you need help shopping? No   Do you need help preparing meals?  No   Do you need help with housework?  No   Do you need help with laundry? No   Do you need help taking your medications? No   Do you need help managing money? No   Do you ever drive or ride in a car without wearing a seat belt? No   Have you felt unusual  stress, anger or loneliness in the last month? Yes   Who do you live with? Alone   If you need help, do you have trouble finding someone available to you? No   Have you been bothered in the last four weeks by sexual problems? No   Do you have difficulty concentrating, remembering or making decisions? No       Age-appropriate Screening Schedule:  Refer to the list below for future screening recommendations based on patient's age, sex and/or medical conditions. Orders for these recommended tests are listed in the plan section. The patient has been provided with a written plan.    Health Maintenance   Topic Date Due    Pneumococcal Vaccine 0-64 (1 of 2 - PCV) Never done    ZOSTER VACCINE (1 of 2) Never done    LUNG CANCER SCREENING  Never done    HEPATITIS C SCREENING  Never done    PAP SMEAR  Never done    MAMMOGRAM  09/14/2019    TDAP/TD VACCINES (2 - Tdap) 12/30/2020    LIPID PANEL  10/13/2023    INFLUENZA VACCINE  08/01/2024    ANNUAL WELLNESS VISIT  05/28/2025    BMI FOLLOWUP  05/28/2025    COLORECTAL CANCER SCREENING  05/19/2026    COVID-19 Vaccine  Completed                  CMS Preventative Services Quick Reference  Risk Factors Identified During Encounter  Fall Risk-High or Moderate: Sit to Stand Exercise Information Shared in After Visit Summary  Tobacco Use/Dependance Risk (use dotphrase .tobaccocessation for documentation)  Dental Screening Recommended  Vision Screening Recommended  The above risks/problems have been discussed with the patient.  Pertinent information has been shared with the patient in the After Visit Summary.  An After Visit Summary and PPPS were made available to the patient.    Follow Up:   Next Medicare Wellness visit to be scheduled in 1 year.       Additional E&M Note during same encounter follows:  Patient has multiple medical problems which are significant and separately identifiable that require additional work above and beyond the Medicare Wellness Visit.      Chief  "Complaint  Medicare Wellness-subsequent    Subjective        HPI  Judy Sosa is also being seen today for medication refills, management of the below listed conditions and screenings.  She refuses screenings.       Review of Systems   Constitutional:  Negative for chills, fatigue and fever.   Respiratory:  Positive for cough.    Cardiovascular:  Positive for chest pain. Negative for palpitations and leg swelling.   Gastrointestinal:  Negative for blood in stool and constipation.   Skin:  Positive for color change (tan skin).   Allergic/Immunologic: Positive for environmental allergies.   Psychiatric/Behavioral:  Positive for dysphoric mood. Negative for self-injury, sleep disturbance and suicidal ideas. The patient is not nervous/anxious.        Objective   Vital Signs:  /59 (BP Location: Left arm, Patient Position: Sitting, Cuff Size: Large Adult)   Pulse 85   Resp 18   Ht 160 cm (62.99\")   Wt 91.4 kg (201 lb 6.4 oz)   SpO2 92%   BMI 35.69 kg/m²     Physical Exam  Vitals and nursing note reviewed.   Constitutional:       General: She is not in acute distress.     Appearance: Normal appearance. She is well-groomed and overweight. She is not ill-appearing.   HENT:      Head: Normocephalic and atraumatic.      Jaw: There is normal jaw occlusion.      Right Ear: Hearing, tympanic membrane, ear canal and external ear normal.      Left Ear: Hearing, tympanic membrane, ear canal and external ear normal.      Nose: Nose normal.      Mouth/Throat:      Lips: Pink.      Mouth: Mucous membranes are moist.      Pharynx: Oropharynx is clear. Uvula midline.   Eyes:      General: Lids are normal. Vision grossly intact.      Extraocular Movements: Extraocular movements intact.      Conjunctiva/sclera: Conjunctivae normal.      Pupils: Pupils are equal, round, and reactive to light. Pupils are equal.   Neck:      Thyroid: No thyromegaly or thyroid tenderness.      Vascular: No carotid bruit.   Cardiovascular:      " Rate and Rhythm: Normal rate and regular rhythm.      Pulses: Normal pulses.      Heart sounds: Normal heart sounds.   Pulmonary:      Effort: Pulmonary effort is normal.      Breath sounds: Normal breath sounds and air entry.   Abdominal:      General: Abdomen is flat. Bowel sounds are normal.      Palpations: Abdomen is soft. Abdomen is not rigid.      Tenderness: There is no abdominal tenderness. There is no right CVA tenderness or left CVA tenderness.   Musculoskeletal:         General: Normal range of motion.      Cervical back: Full passive range of motion without pain, normal range of motion and neck supple.      Right lower leg: No edema.      Left lower leg: No edema.   Skin:     General: Skin is warm and dry.      Capillary Refill: Capillary refill takes less than 2 seconds.      Comments: Excess sun exposure.      Neurological:      General: No focal deficit present.      Mental Status: She is alert and oriented to person, place, and time. Mental status is at baseline.   Psychiatric:         Attention and Perception: Attention and perception normal.         Mood and Affect: Mood and affect normal.         Speech: Speech normal.         Behavior: Behavior normal. Behavior is cooperative.      Comments: At baseline.          The following data was reviewed by: TIERRA Mckeon on 05/28/2024:  CMP          10/10/2023    16:01   CMP   Glucose 102    BUN 12    Creatinine 0.95    Sodium 145    Potassium 3.3    Chloride 99    Calcium 8.5    Total Protein 7.5    Albumin 4.5    Globulin 3.0    Total Bilirubin 0.3    Alkaline Phosphatase 120    AST (SGOT) 14    ALT (SGPT) 17    BUN/Creatinine Ratio 13      CBC w/diff          10/10/2023    16:01   CBC w/Diff   WBC 14.4    RBC 5.01    Hemoglobin 15.3    Hematocrit 44.9    MCV 90    MCH 30.5    MCHC 34.1    RDW 12.5    Platelets 273    Neutrophil Rel % 69    Lymphocyte Rel % 23    Monocyte Rel % 5    Eosinophil Rel % 3    Basophil Rel % 0        TSH           10/10/2023    16:01   TSH   TSH 2.780      Most Recent A1C          5/28/2024    14:55   HGBA1C Most Recent   Hemoglobin A1C 6.4      UA          5/28/2024    14:57   Urinalysis   Ketones, UA Negative    Leukocytes, UA Negative                 Assessment and Plan   Diagnoses and all orders for this visit:    1. Encounter for Medicare annual wellness exam (Primary)  -     POCT urinalysis dipstick, multipro    2. Neutrophilic leukocytosis  Comments:  Recheck blood work and peripheral smear.    3. Chest pain, unspecified type  Comments:  refuses intervention. Does not want EKG compelted and no further workup.    4. Lymphocytosis  Comments:  Repeat blood work and peripheral smear ordered.  Follow-up after lab results.  Orders:  -     Peripheral Blood Smear  -     CBC w AUTO Differential  -     Comprehensive metabolic panel    5. Mixed hyperlipidemia  -     simvastatin (ZOCOR) 20 MG tablet; Take 1 tablet by mouth every night at bedtime.  Dispense: 90 tablet; Refill: 3  -     Lipid Panel With LDL/HDL Ratio    6. Gastroesophageal reflux disease without esophagitis  Comments:  Does not take medication.  Symptoms controlled with diet.    7. Prediabetes  -     POC Glycosylated Hemoglobin (Hb A1C)    8. Chronic obstructive pulmonary disease, unspecified COPD type    9. Hypokalemia  -     potassium chloride ER (K-TAB) 20 MEQ tablet controlled-release ER tablet; Take 1 tablet by mouth 2 (Two) Times a Day.  Dispense: 180 tablet; Refill: 3    10. Body mass index 35.0-35.9, adult  Comments:  Encouraged regular exercise and healthy heart diet.    11. Irritable bowel syndrome with both constipation and diarrhea  Comments:  Symptoms controlled with diet.    12. Seasonal allergies  -     cetirizine (zyrTEC) 10 MG tablet; Take 1 tablet by mouth Daily.  Dispense: 30 tablet; Refill: 11    13. Localized edema  -     hydroCHLOROthiazide 25 MG tablet; Take 1 tablet by mouth Daily.  Dispense: 90 tablet; Refill: 3    14. Screening for thyroid  disorder  -     TSH  -     T4, free    15. Heavy tobacco smoker  Comments:  2 ppd.   Does not want to stop smoking.  Refuses lung cnacer CT screening    16. Paranoid schizophrenia  Comments:  Mood controlled and patient is compliant with current treatment.  Follow-up at next exam.    17. Grief  Comments:  Lost brother 4/25/24 due to heart attack.    18. Screening mammogram for breast cancer  Comments:  Refuses screening.    19. Familial hypercholesterolemia    20. Screening for colon cancer  Comments:  Refuses screening.             Follow Up   Return in about 6 months (around 11/28/2024) for Recheck a1c.  Patient was given instructions and counseling regarding her condition or for health maintenance advice. Please see specific information pulled into the AVS if appropriate.

## 2024-05-29 LAB
ALBUMIN SERPL-MCNC: 4.1 G/DL (ref 3.8–4.9)
ALBUMIN/GLOB SERPL: 1.4 {RATIO} (ref 1.2–2.2)
ALP SERPL-CCNC: 109 IU/L (ref 44–121)
ALT SERPL-CCNC: 21 IU/L (ref 0–32)
AST SERPL-CCNC: 20 IU/L (ref 0–40)
BASOPHILS # BLD AUTO: 0 X10E3/UL (ref 0–0.2)
BASOPHILS NFR BLD AUTO: 0 %
BILIRUB SERPL-MCNC: <0.2 MG/DL (ref 0–1.2)
BUN SERPL-MCNC: 12 MG/DL (ref 6–24)
BUN/CREAT SERPL: 14 (ref 9–23)
CALCIUM SERPL-MCNC: 8.4 MG/DL (ref 8.7–10.2)
CHLORIDE SERPL-SCNC: 102 MMOL/L (ref 96–106)
CHOLEST SERPL-MCNC: 149 MG/DL (ref 100–199)
CO2 SERPL-SCNC: 28 MMOL/L (ref 20–29)
CREAT SERPL-MCNC: 0.88 MG/DL (ref 0.57–1)
EGFRCR SERPLBLD CKD-EPI 2021: 76 ML/MIN/1.73
EOSINOPHIL # BLD AUTO: 0.2 X10E3/UL (ref 0–0.4)
EOSINOPHIL NFR BLD AUTO: 2 %
ERYTHROCYTE [DISTWIDTH] IN BLOOD BY AUTOMATED COUNT: 13.2 % (ref 11.7–15.4)
GLOBULIN SER CALC-MCNC: 2.9 G/DL (ref 1.5–4.5)
GLUCOSE SERPL-MCNC: 87 MG/DL (ref 70–99)
HCT VFR BLD AUTO: 47 % (ref 34–46.6)
HDLC SERPL-MCNC: 40 MG/DL
HGB BLD-MCNC: 15.3 G/DL (ref 11.1–15.9)
IMM GRANULOCYTES # BLD AUTO: 0.1 X10E3/UL (ref 0–0.1)
IMM GRANULOCYTES NFR BLD AUTO: 1 %
LDLC SERPL CALC-MCNC: 83 MG/DL (ref 0–99)
LDLC/HDLC SERPL: 2.1 RATIO (ref 0–3.2)
LYMPHOCYTES # BLD AUTO: 3.2 X10E3/UL (ref 0.7–3.1)
LYMPHOCYTES NFR BLD AUTO: 30 %
MCH RBC QN AUTO: 30.5 PG (ref 26.6–33)
MCHC RBC AUTO-ENTMCNC: 32.6 G/DL (ref 31.5–35.7)
MCV RBC AUTO: 94 FL (ref 79–97)
MONOCYTES # BLD AUTO: 0.6 X10E3/UL (ref 0.1–0.9)
MONOCYTES NFR BLD AUTO: 5 %
NEUTROPHILS # BLD AUTO: 6.8 X10E3/UL (ref 1.4–7)
NEUTROPHILS NFR BLD AUTO: 62 %
PLATELET # BLD AUTO: 256 X10E3/UL (ref 150–450)
POTASSIUM SERPL-SCNC: 3.3 MMOL/L (ref 3.5–5.2)
PROT SERPL-MCNC: 7 G/DL (ref 6–8.5)
RBC # BLD AUTO: 5.02 X10E6/UL (ref 3.77–5.28)
SODIUM SERPL-SCNC: 144 MMOL/L (ref 134–144)
T4 FREE SERPL-MCNC: 1.2 NG/DL (ref 0.82–1.77)
TRIGL SERPL-MCNC: 146 MG/DL (ref 0–149)
TSH SERPL DL<=0.005 MIU/L-ACNC: 2.8 UIU/ML (ref 0.45–4.5)
VLDLC SERPL CALC-MCNC: 26 MG/DL (ref 5–40)
WBC # BLD AUTO: 10.9 X10E3/UL (ref 3.4–10.8)

## 2025-02-24 DIAGNOSIS — J30.2 SEASONAL ALLERGIES: ICD-10-CM

## 2025-02-24 DIAGNOSIS — E78.2 MIXED HYPERLIPIDEMIA: ICD-10-CM

## 2025-02-24 DIAGNOSIS — R60.0 LOCALIZED EDEMA: ICD-10-CM

## 2025-02-24 RX ORDER — HYDROCHLOROTHIAZIDE 25 MG/1
25 TABLET ORAL DAILY
Qty: 90 TABLET | Refills: 3 | Status: SHIPPED | OUTPATIENT
Start: 2025-02-24

## 2025-02-24 RX ORDER — SIMVASTATIN 20 MG
20 TABLET ORAL
Qty: 90 TABLET | Refills: 3 | Status: SHIPPED | OUTPATIENT
Start: 2025-02-24

## 2025-02-24 RX ORDER — CETIRIZINE HYDROCHLORIDE 10 MG/1
10 TABLET ORAL DAILY
Qty: 30 TABLET | Refills: 11 | Status: SHIPPED | OUTPATIENT
Start: 2025-02-24

## 2025-02-24 NOTE — TELEPHONE ENCOUNTER
Caller: Judy Sosa    Relationship: Self    Best call back number: [phone]     Requested Prescriptions:   Requested Prescriptions     Pending Prescriptions Disp Refills    hydroCHLOROthiazide 25 MG tablet 90 tablet 3     Sig: Take 1 tablet by mouth Daily.    simvastatin (ZOCOR) 20 MG tablet 90 tablet 3     Sig: Take 1 tablet by mouth every night at bedtime.    cetirizine (zyrTEC) 10 MG tablet 30 tablet 11     Sig: Take 1 tablet by mouth Daily.        [id number] [facility]  [address] [facility] - [phone]  - [phone] FX     Last office visit with prescribing clinician: 5/28/2024   Last telemedicine visit with prescribing clinician: Visit date not found   Next office visit with prescribing clinician: 6/23/2025     Additional details provided by patient: 90 DAY SUPPLY    Does the patient have less than a 3 day supply:  [] Yes  [x] No    Would you like a call back once the refill request has been completed: [] Yes [] No    If the office needs to give you a call back, can they leave a voicemail: [] Yes [] No    Keenan Leach Rep   02/24/25 10:22 EST

## 2025-03-16 DIAGNOSIS — E78.2 MIXED HYPERLIPIDEMIA: ICD-10-CM

## 2025-03-16 DIAGNOSIS — R60.0 LOCALIZED EDEMA: ICD-10-CM

## 2025-03-17 RX ORDER — SIMVASTATIN 20 MG
20 TABLET ORAL
Qty: 90 TABLET | Refills: 1 | Status: SHIPPED | OUTPATIENT
Start: 2025-03-17

## 2025-03-17 RX ORDER — HYDROCHLOROTHIAZIDE 25 MG/1
25 TABLET ORAL DAILY
Qty: 90 TABLET | Refills: 1 | Status: SHIPPED | OUTPATIENT
Start: 2025-03-17

## 2025-06-20 NOTE — PROGRESS NOTES
Subjective   The ABCs of the Annual Wellness Visit  Medicare Wellness Visit      Judy Sosa is a 59 y.o. patient who presents for a Medicare Wellness Visit.    The following portions of the patient's history were reviewed and   updated as appropriate: allergies, current medications, past family history, past medical history, past social history, past surgical history, and problem list.    Compared to one year ago, the patient's physical   health is the same.  Compared to one year ago, the patient's mental   health is the same.    Recent Hospitalizations:  She was not admitted to the hospital during the last year.     Current Medical Providers:  Patient Care Team:  Joie Luo APRN as PCP - General (Nurse Practitioner)  Dr. Haq and Danette Cobb (therapist) [facility]  Dr. Mora - Oral Surgery  Dr. Cooley - Dental        Outpatient Medications Prior to Visit   Medication Sig Dispense Refill    Bioflavonoid Products (Vitamin C Plus) 1000 MG tablet Take 1 tablet by mouth Daily.      cetirizine (zyrTEC) 10 MG tablet Take 1 tablet by mouth Daily. 30 tablet 11    OLANZapine (zyPREXA) 15 MG tablet Take 1 tablet by mouth Every 12 (Twelve) Hours.      propranolol (INDERAL) 20 MG tablet PROPRANOLOL HCL 20 MG TABS      topiramate (TOPAMAX) 200 MG tablet TOPAMAX 200 MG TABS      albuterol sulfate  (90 Base) MCG/ACT inhaler Inhale 2 puffs Every 4 (Four) Hours As Needed for Wheezing. 6.7 g 0    guaifenesin (ROBITUSSIN) 100 MG/5ML liquid Take 10 mL by mouth 3 (Three) Times a Day As Needed for Cough. 118 mL 0    hydroCHLOROthiazide 25 MG tablet TAKE 1 TABLET BY MOUTH EVERY DAY 90 tablet 1    OLANZapine (zyPREXA) 10 MG tablet ZYPREXA 10 MG TABS      potassium chloride ER (K-TAB) 20 MEQ tablet controlled-release ER tablet Take 1 tablet by mouth 2 (Two) Times a Day. 180 tablet 3    simvastatin (ZOCOR) 20 MG tablet TAKE 1 TABLET BY MOUTH EVERYDAY AT BEDTIME 90 tablet 1    trimethoprim-polymyxin b (POLYTRIM)  "24060-8.1 UNIT/ML-% ophthalmic solution ADMINISTER 1 DROP TO BOTH EYES EVERY 4 HOURS. 10 mL 0    azithromycin (Zithromax Z-Rafael) 250 MG tablet Take 2 tablets by mouth on day 1, then 1 tablet daily on days 2-5 6 tablet 0     No facility-administered medications prior to visit.     No opioid medication identified on active medication list. I have reviewed chart for other potential  high risk medication/s and harmful drug interactions in the elderly.      Aspirin is not on active medication list.  Aspirin use is not indicated based on review of current medical condition/s. Risk of harm outweighs potential benefits.  .    Patient Active Problem List   Diagnosis    Amenorrhea    Chronic obstructive pulmonary disease    Colitis    Cough    Dysmenorrhea    Edema    Neutrophilic leukocytosis    Gastroesophageal reflux disease    Hyperlipidemia    Irritable bowel syndrome    Paranoid schizophrenia    Encounter for subsequent annual wellness visit (AWV) in Medicare patient    COPD with exacerbation    Chest pain    Dermatitis    Seasonal allergies    Familial hypercholesterolemia    Localized edema    Grief    Other constipation    Worried well    Screening mammogram for breast cancer    Heavy tobacco smoker    Screening for colon cancer    Fatigue    Adventitious breath sounds    Chest congestion    Prediabetes     Advance Care Planning Advance Directive is not on file.  ACP discussion was held with the patient during this visit. Patient does not have an advance directive, information provided.            Objective   Vitals:    06/23/25 1401   BP: 102/69   Pulse: 82   Resp: 18   Temp: 97.4 °F (36.3 °C)   TempSrc: Temporal   SpO2: 92%   Weight: 88.2 kg (194 lb 6.4 oz)   Height: 160 cm (62.99\")   PainSc: 0-No pain       Estimated body mass index is 34.45 kg/m² as calculated from the following:    Height as of this encounter: 160 cm (62.99\").    Weight as of this encounter: 88.2 kg (194 lb 6.4 oz).    Class 2 Severe Obesity (BMI " >=35 and <=39.9). Obesity-related health conditions include the following: none. Obesity is unchanged. BMI is is above average; BMI management plan is completed. We discussed portion control and increasing exercise.           Does the patient have evidence of cognitive impairment? No                                                                                                Health  Risk Assessment    Smoking Status:  Social History     Tobacco Use   Smoking Status Every Day    Current packs/day: 1.00    Average packs/day: 1 pack/day for 38.0 years (38.0 ttl pk-yrs)    Types: Cigarettes   Smokeless Tobacco Never     Alcohol Consumption:  Social History     Substance and Sexual Activity   Alcohol Use No       Fall Risk Screen  STEADI Fall Risk Assessment was completed, and patient is at LOW risk for falls.Assessment completed on:2025    Depression Screening   Little interest or pleasure in doing things? Not at all   Feeling down, depressed, or hopeless? Not at all   PHQ-2 Total Score 0      Health Habits and Functional and Cognitive Screenin/23/2025     1:54 PM   Functional & Cognitive Status   Do you have difficulty preparing food and eating? No   Do you have difficulty bathing yourself, getting dressed or grooming yourself? No   Do you have difficulty using the toilet? No   Do you have difficulty moving around from place to place? No   Do you have trouble with steps or getting out of a bed or a chair? No   Current Diet Well Balanced Diet   Dental Exam Up to date   Eye Exam Up to date   Exercise (times per week) 0 times per week   Current Exercises Include No Regular Exercise   Do you need help using the phone?  No   Are you deaf or do you have serious difficulty hearing?  Yes   Do you need help to go to places out of walking distance? No   Do you need help shopping? No   Do you need help preparing meals?  No   Do you need help with housework?  No   Do you need help with laundry? No   Do you need  help taking your medications? No   Do you need help managing money? No   Do you ever drive or ride in a car without wearing a seat belt? No   Have you felt unusual fatigue (could be tiredness), stress, anger or loneliness in the last month? No   Who do you live with? Alone   If you need help, do you have trouble finding someone available to you? No   Have you been bothered in the last four weeks by sexual problems? No   Do you have difficulty concentrating, remembering or making decisions? No           Age-appropriate Screening Schedule:  Refer to the list below for future screening recommendations based on patient's age, sex and/or medical conditions. Orders for these recommended tests are listed in the plan section. The patient has been provided with a written plan.    Health Maintenance List  Health Maintenance   Topic Date Due    HEPATITIS C SCREENING  Never done    LIPID PANEL  05/28/2025    COVID-19 Vaccine (5 - 2024-25 season) 07/07/2025 (Originally 9/1/2024)    Pneumococcal Vaccine 50+ (1 of 2 - PCV) 12/20/2025 (Originally 8/15/1983)    TDAP/TD VACCINES (2 - Tdap) 12/20/2025 (Originally 12/30/2020)    ZOSTER VACCINE (1 of 2) 12/20/2025 (Originally 8/15/2014)    MAMMOGRAM  06/23/2026 (Originally 9/14/2019)    PAP SMEAR  06/23/2026 (Originally 8/15/1985)    LUNG CANCER SCREENING  06/23/2026 (Originally 8/15/2014)    INFLUENZA VACCINE  07/01/2025    COLORECTAL CANCER SCREENING  05/19/2026    ANNUAL WELLNESS VISIT  06/23/2026                                                                                                                                                CMS Preventative Services Quick Reference  Risk Factors Identified During Encounter  None Identified    The above risks/problems have been discussed with the patient.  Pertinent information has been shared with the patient in the After Visit Summary.  An After Visit Summary and PPPS were made available to the patient.    Follow Up:   Next Medicare  Wellness visit to be scheduled in 1 year.         Additional E&M Note during same encounter follows:  Patient has additional, significant, and separately identifiable condition(s)/problem(s) that require work above and beyond the Medicare Wellness Visit     Chief Complaint  Medicare Wellness-subsequent    Subjective    HPI  Judy is also being seen today for additional medical problem/s.       The patient presents for an annual Medicare wellness visit.    She continues to receive care from Dr. Haq at [facility] [place] and has recently resumed monthly therapy sessions with Danette Leblanc at the same facility. She reports feeling fatigued but manages to cope independently. She is not fasting today. She is not sexually active and has no history of abnormal Pap smears. She declines a Pap smear, mammogram, lung cancer screening CT scan, colonoscopy, tetanus booster, pneumonia vaccine, shingles vaccine, and chest x-ray. She has been informed of her prediabetic status. She maintains a diet primarily consisting of processed foods, with limited intake of fresh fruits and vegetables. She does not consume alcohol or use illicit drugs.     She has a history of choking incidents, and is cautious on what type of foods and medications to consume. She also reports shortness of breath, coughing, wheezing, chest pain, and palpitations. Her cough produces yellow and black sputum. She reports no congestion, ear, nose, or throat problems, or allergies. She experiences muscle aches in her neck, back, and occasionally her knees. She discontinued exercise in 2002 due to tachycardia. She has noticed blood in her stools but reports no blood in her urine. She is uncertain about the presence of hemorrhoids. She has observed a recent change in stool color to black. She has not provided a urine sample today.    She reports she has been diagnosed with oral cancer by her dentist, Dr. Cooley.  She last consulted with oral surgeon Dr. Mora on  "05/27/2025 and with Dr. Cooley in March or April 2025. Records have been requested.    She has attempted smoking cessation through the 1-800-QUIT-NOW program but was unsuccessful due to intolerance to gum and patches, which exacerbated her cigarette cravings. She currently smokes about 2 to 2.5 packs of cigarettes per day.    She had her teeth pulled on 05/27/2025 and is currently not experiencing any issues.    SOCIAL HISTORY  The patient smokes about 2 to 2.5 packs a day. She does not drink alcohol or use illicit drugs.  Review of Systems   Constitutional: Negative.    HENT: Negative.     Eyes: Negative.    Respiratory:  Positive for cough, shortness of breath and wheezing.    Cardiovascular:  Positive for chest pain and palpitations. Negative for leg swelling.   Gastrointestinal:  Positive for blood in stool.   Genitourinary:  Negative for hematuria.   Musculoskeletal:  Positive for neck pain.   Skin:         Tanned skin    Allergic/Immunologic: Positive for environmental allergies.   All other systems reviewed and are negative.         Objective   Vital Signs:  /69   Pulse 82   Temp 97.4 °F (36.3 °C) (Temporal)   Resp 18   Ht 160 cm (62.99\")   Wt 88.2 kg (194 lb 6.4 oz)   SpO2 92%   BMI 34.45 kg/m²   Physical Exam  Vitals and nursing note reviewed.   Constitutional:       General: She is not in acute distress.     Appearance: Normal appearance. She is obese. She is not toxic-appearing.   HENT:      Head: Normocephalic and atraumatic.      Nose: Nose normal.      Mouth/Throat:      Mouth: Mucous membranes are moist.      Pharynx: Oropharynx is clear.   Eyes:      Extraocular Movements: Extraocular movements intact.      Pupils: Pupils are equal, round, and reactive to light.   Neck:      Vascular: No carotid bruit.   Cardiovascular:      Rate and Rhythm: Normal rate and regular rhythm.      Heart sounds: Normal heart sounds.   Pulmonary:      Effort: Pulmonary effort is normal. No tachypnea, " bradypnea, accessory muscle usage, prolonged expiration or respiratory distress.      Breath sounds: Normal air entry. Rhonchi present.      Comments: Adventitious breath sounds noted throughout.  Abdominal:      General: Abdomen is flat. Bowel sounds are normal.      Palpations: Abdomen is soft.   Musculoskeletal:      Cervical back: Normal range of motion.      Right lower leg: No edema.      Left lower leg: No edema.   Skin:     General: Skin is warm and dry.      Comments: Tanned skin    Neurological:      Mental Status: She is alert and oriented to person, place, and time. Mental status is at baseline.   Psychiatric:         Mood and Affect: Mood normal.         Behavior: Behavior normal. Behavior is cooperative.         Thought Content: Thought content normal.           Respiratory: Congested breath sounds  Extremities: Edema in the ankles            Results     Re        Assessment and Plan Additional age appropriate preventative wellness advice topics were discussed during today's preventative wellness exam(some topics already addressed during AWV portion of the note above):    Physical Activity: Advised cardiovascular activity 150 minutes per week as tolerated. (example brisk walk for 30 minutes, 5 days a week).   Nutrition: Discussed nutrition plan with patient. Information shared in after visit summary. Goal is for a well balanced diet to enhance overall health.   Healthy Weight: Discussed current and goal BMI with patient. Steps to attain this goal discussed. Information shared in after visit summary.   Injury Prevention Discussion:  Information shared in after visit summary.             1. Annual Medicare wellness visit.  - Advised to incorporate regular exercise into her routine and adhere to a heart-healthy Mediterranean diet.  - Use of illicit drugs and alcohol consumption are strongly discouraged; encouraged to wear a seatbelt while driving.  - Prescriptions for hydrochlorothiazide and Zocor have  been sent to pharmacy.  - Comprehensive panel of blood tests will be conducted, including A1c, blood count, metabolic profile, cholesterol, and thyroid function tests; urine sample will also be collected for analysis.  -She refuses all preventive health screenings with exception of blood tests.     2. Chronic Obstructive Pulmonary Disease (COPD).  - Strongly advised to cease smoking.  - Consultation with a pulmonologist recommended for further evaluation of COPD and breath sounds.  - Should have an albuterol inhaler available for use as needed.  - Lungs sound congested; advised to get a chest x-ray.    3. Oral Cancer.  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 Cooley opted against surgery.  - Advised to have Dr. Cooley and Dr. Mora send in his diagnosis for her chart.  - No current symptoms reported.    - She reports no recent growth of cancer noted in imaging.        Assessment & Plan  Encounter for subsequent annual wellness visit (AWV) in Medicare patient  Discussed injury prevention, diet and exercise, safe sexual practices, and screening for common diseases.  Encouraged use of sunscreen and seatbelts. Discussed timing of  cervical cancer screening.   Encouraged monthly self-breast exams, yearly clinical breast exams, and discussed timing of mammograms.   Avoidance of tobacco encouraged.   Limitation or avoidance of alcohol encouraged.   Anticipatory guidance given and routine screenings recommended with recommendations of yearly dental and eye exams, monitoring of blood pressure, lipids, and screening for colon and lung cancer risks.     Orders:    Hemoglobin A1c    Prediabetes  Recheck A1C  Orders:    POC Urinalysis Dipstick, Multipro    Mixed hyperlipidemia   Lipid levels will be checked.  Continue diet, lifestyle and medication.       Orders:    Comprehensive Metabolic Panel    Lipid Panel With / Chol / HDL Ratio    simvastatin (ZOCOR) 20 MG tablet; Take 1 tablet by mouth every night  at bedtime.    Chronic obstructive pulmonary disease, unspecified COPD type  COPD is worsening.    Plan:  Continue same medication/s without change.   and Refuses intervention and monitoring.    Discussed medication dosage, use, side effects, and goals of treatment in detail.    Warning signs of respiratory distress were reviewed with the patient.   Smoking cessation discussed: Information shared with patient..    Patient Treatment Goals:   symptom prevention, minimizing limitation in activity, prevention of exacerbations and use of ER/inpatient care, maintenance of optimal pulmonary function, and minimization of adverse effects of treatment    Followup at the next regular appointment.         Lymphocytosis    Orders:    CBC & Differential    Fatigue, unspecified type    Orders:    TSH Rfx On Abnormal To Free T4    Adventitious breath sounds  Refuses chest x ray.       Heavy tobacco smoker  1-800-Quit-Now  Can't chew gum, patches make her crave cigarettes.          Need for hepatitis C screening test    Orders:    Hepatitis C Antibody        Follow Up   Return in about 1 year (around 6/23/2026) for Medicare Wellness, Recheck.  Patient was given instructions and counseling regarding her condition or for health maintenance advice. Please see specific information pulled into the AVS if appropriate.  Patient or patient representative verbalized consent for the use of Ambient Listening during the visit with  TIERRA Mckeon for chart documentation. 6/23/2025  14:19 EDT

## 2025-06-23 ENCOUNTER — OFFICE VISIT (OUTPATIENT)
Dept: FAMILY MEDICINE CLINIC | Facility: CLINIC | Age: 60
End: 2025-06-23
Payer: MEDICARE

## 2025-06-23 VITALS
SYSTOLIC BLOOD PRESSURE: 102 MMHG | DIASTOLIC BLOOD PRESSURE: 69 MMHG | BODY MASS INDEX: 34.45 KG/M2 | WEIGHT: 194.4 LBS | RESPIRATION RATE: 18 BRPM | TEMPERATURE: 97.4 F | HEART RATE: 82 BPM | OXYGEN SATURATION: 92 % | HEIGHT: 63 IN

## 2025-06-23 DIAGNOSIS — R73.03 PREDIABETES: ICD-10-CM

## 2025-06-23 DIAGNOSIS — J44.9 CHRONIC OBSTRUCTIVE PULMONARY DISEASE, UNSPECIFIED COPD TYPE: ICD-10-CM

## 2025-06-23 DIAGNOSIS — F17.200 HEAVY TOBACCO SMOKER: ICD-10-CM

## 2025-06-23 DIAGNOSIS — R53.83 FATIGUE, UNSPECIFIED TYPE: ICD-10-CM

## 2025-06-23 DIAGNOSIS — Z11.59 NEED FOR HEPATITIS C SCREENING TEST: Primary | ICD-10-CM

## 2025-06-23 DIAGNOSIS — E78.2 MIXED HYPERLIPIDEMIA: ICD-10-CM

## 2025-06-23 DIAGNOSIS — R06.89 ADVENTITIOUS BREATH SOUNDS: ICD-10-CM

## 2025-06-23 DIAGNOSIS — Z00.00 ENCOUNTER FOR SUBSEQUENT ANNUAL WELLNESS VISIT (AWV) IN MEDICARE PATIENT: ICD-10-CM

## 2025-06-23 DIAGNOSIS — D72.820 LYMPHOCYTOSIS: ICD-10-CM

## 2025-06-23 PROCEDURE — 99214 OFFICE O/P EST MOD 30 MIN: CPT

## 2025-06-23 PROCEDURE — G0439 PPPS, SUBSEQ VISIT: HCPCS

## 2025-06-23 PROCEDURE — 1170F FXNL STATUS ASSESSED: CPT

## 2025-06-23 PROCEDURE — 1126F AMNT PAIN NOTED NONE PRSNT: CPT

## 2025-06-23 PROCEDURE — G2211 COMPLEX E/M VISIT ADD ON: HCPCS

## 2025-06-23 RX ORDER — HYDROCHLOROTHIAZIDE 25 MG/1
25 TABLET ORAL DAILY
Qty: 90 TABLET | Refills: 3 | Status: SHIPPED | OUTPATIENT
Start: 2025-06-23

## 2025-06-23 RX ORDER — OLANZAPINE 15 MG/1
1 TABLET, FILM COATED ORAL EVERY 12 HOURS SCHEDULED
COMMUNITY
Start: 2025-05-19

## 2025-06-23 RX ORDER — SIMVASTATIN 20 MG
20 TABLET ORAL
Qty: 90 TABLET | Refills: 3 | Status: SHIPPED | OUTPATIENT
Start: 2025-06-23

## 2025-06-23 NOTE — ASSESSMENT & PLAN NOTE
COPD is worsening.    Plan:  Continue same medication/s without change.   and Refuses intervention and monitoring.    Discussed medication dosage, use, side effects, and goals of treatment in detail.    Warning signs of respiratory distress were reviewed with the patient.   Smoking cessation discussed: Information shared with patient..    Patient Treatment Goals:   symptom prevention, minimizing limitation in activity, prevention of exacerbations and use of ER/inpatient care, maintenance of optimal pulmonary function, and minimization of adverse effects of treatment    Followup at the next regular appointment.

## 2025-06-23 NOTE — ASSESSMENT & PLAN NOTE
Discussed injury prevention, diet and exercise, safe sexual practices, and screening for common diseases.  Encouraged use of sunscreen and seatbelts. Discussed timing of  cervical cancer screening.   Encouraged monthly self-breast exams, yearly clinical breast exams, and discussed timing of mammograms.   Avoidance of tobacco encouraged.   Limitation or avoidance of alcohol encouraged.   Anticipatory guidance given and routine screenings recommended with recommendations of yearly dental and eye exams, monitoring of blood pressure, lipids, and screening for colon and lung cancer risks.     Orders:    Hemoglobin A1c

## 2025-06-23 NOTE — ASSESSMENT & PLAN NOTE
Lipid levels will be checked.  Continue diet, lifestyle and medication.       Orders:    Comprehensive Metabolic Panel    Lipid Panel With / Chol / HDL Ratio    simvastatin (ZOCOR) 20 MG tablet; Take 1 tablet by mouth every night at bedtime.

## 2025-06-24 ENCOUNTER — TELEPHONE (OUTPATIENT)
Dept: FAMILY MEDICINE CLINIC | Facility: CLINIC | Age: 60
End: 2025-06-24

## 2025-06-24 ENCOUNTER — TELEPHONE (OUTPATIENT)
Dept: FAMILY MEDICINE CLINIC | Facility: CLINIC | Age: 60
End: 2025-06-24
Payer: MEDICARE

## 2025-06-24 DIAGNOSIS — E83.51 HYPOCALCEMIA: Primary | ICD-10-CM

## 2025-06-24 DIAGNOSIS — K59.09 OTHER CONSTIPATION: Primary | ICD-10-CM

## 2025-06-24 LAB
ALBUMIN SERPL-MCNC: 4.1 G/DL (ref 3.8–4.9)
ALP SERPL-CCNC: 106 IU/L (ref 44–121)
ALT SERPL-CCNC: 13 IU/L (ref 0–32)
AST SERPL-CCNC: 15 IU/L (ref 0–40)
BASOPHILS # BLD AUTO: 0.1 X10E3/UL (ref 0–0.2)
BASOPHILS NFR BLD AUTO: 0 %
BILIRUB SERPL-MCNC: 0.2 MG/DL (ref 0–1.2)
BUN SERPL-MCNC: 14 MG/DL (ref 8–27)
BUN/CREAT SERPL: 17 (ref 12–28)
CALCIUM SERPL-MCNC: 8.4 MG/DL (ref 8.7–10.3)
CHLORIDE SERPL-SCNC: 96 MMOL/L (ref 96–106)
CHOLEST SERPL-MCNC: 150 MG/DL (ref 100–199)
CHOLEST/HDLC SERPL: 3.5 RATIO (ref 0–4.4)
CO2 SERPL-SCNC: 26 MMOL/L (ref 20–29)
CREAT SERPL-MCNC: 0.82 MG/DL (ref 0.57–1)
EGFRCR SERPLBLD CKD-EPI 2021: 82 ML/MIN/1.73
EOSINOPHIL # BLD AUTO: 0.2 X10E3/UL (ref 0–0.4)
EOSINOPHIL NFR BLD AUTO: 2 %
ERYTHROCYTE [DISTWIDTH] IN BLOOD BY AUTOMATED COUNT: 13.7 % (ref 11.7–15.4)
GLOBULIN SER CALC-MCNC: 2.7 G/DL (ref 1.5–4.5)
GLUCOSE SERPL-MCNC: 84 MG/DL (ref 70–99)
HBA1C MFR BLD: 6.6 % (ref 4.8–5.6)
HCT VFR BLD AUTO: 45.1 % (ref 34–46.6)
HCV IGG SERPL QL IA: NON REACTIVE
HDLC SERPL-MCNC: 43 MG/DL
HGB BLD-MCNC: 14.8 G/DL (ref 11.1–15.9)
IMM GRANULOCYTES # BLD AUTO: 0.1 X10E3/UL (ref 0–0.1)
IMM GRANULOCYTES NFR BLD AUTO: 1 %
LDLC SERPL CALC-MCNC: 80 MG/DL (ref 0–99)
LYMPHOCYTES # BLD AUTO: 3.5 X10E3/UL (ref 0.7–3.1)
LYMPHOCYTES NFR BLD AUTO: 28 %
MCH RBC QN AUTO: 30.6 PG (ref 26.6–33)
MCHC RBC AUTO-ENTMCNC: 32.8 G/DL (ref 31.5–35.7)
MCV RBC AUTO: 93 FL (ref 79–97)
MONOCYTES # BLD AUTO: 0.7 X10E3/UL (ref 0.1–0.9)
MONOCYTES NFR BLD AUTO: 5 %
NEUTROPHILS # BLD AUTO: 8 X10E3/UL (ref 1.4–7)
NEUTROPHILS NFR BLD AUTO: 64 %
PLATELET # BLD AUTO: 306 X10E3/UL (ref 150–450)
POTASSIUM SERPL-SCNC: 3.2 MMOL/L (ref 3.5–5.2)
PROT SERPL-MCNC: 6.8 G/DL (ref 6–8.5)
RBC # BLD AUTO: 4.83 X10E6/UL (ref 3.77–5.28)
SODIUM SERPL-SCNC: 140 MMOL/L (ref 134–144)
TRIGL SERPL-MCNC: 158 MG/DL (ref 0–149)
TSH SERPL DL<=0.005 MIU/L-ACNC: 2.27 UIU/ML (ref 0.45–4.5)
VLDLC SERPL CALC-MCNC: 27 MG/DL (ref 5–40)
WBC # BLD AUTO: 12.6 X10E3/UL (ref 3.4–10.8)

## 2025-06-24 NOTE — TELEPHONE ENCOUNTER
Caller: Judy Sosa    Relationship: Self    Best call back number:   Telephone Information:   Mobile [phone]         What medication are you requesting: SOMETHING FOR SEVERE CONSTIPATION - FORGOT TO MENTION IN OFFICE YESTERDAY     What are your current symptoms:     How long have you been experiencing symptoms:     Have you had these symptoms before:    [] Yes  [] No    Have you been treated for these symptoms before:   [] Yes  [] No    If a prescription is needed, what is your preferred pharmacy and phone number: [id number] [facility] [address] [address] AT [facility] OF  &  - [phone]  - [phone] FX     Additional notes:

## 2025-06-24 NOTE — TELEPHONE ENCOUNTER
Pt requesting long-term use medication for constipation as she has tried and failed OTC medications.

## 2025-06-30 PROBLEM — E11.9 TYPE 2 DIABETES MELLITUS WITHOUT COMPLICATION, WITHOUT LONG-TERM CURRENT USE OF INSULIN: Status: ACTIVE | Noted: 2025-06-30

## 2025-06-30 NOTE — PROGRESS NOTES
Chief Complaint  Diabetes    Subjective      History of Present Illness:  Judy Sosa is a 59 y.o. female who presents to [facility] for new diagnosis of diabetes    Diabetes  Visit type:  Initial  Diabetes type:  Type 2  Associated symptoms:     no chest pain, no polydipsia, no polyphagia and no polyuria        History of Present Illness  The patient is a 59-year-old female who presents to discuss new onset diabetes.    She has been monitoring her blood glucose levels using a glucometer, although she does not own one herself. A recent reading, taken by a friend, indicated a blood sugar level of approximately 270 prior to a meal.    Patient Active Problem List   Diagnosis    Amenorrhea    Chronic obstructive pulmonary disease    Colitis    Cough    Dysmenorrhea    Edema    Neutrophilic leukocytosis    Gastroesophageal reflux disease    Hyperlipidemia    Irritable bowel syndrome    Paranoid schizophrenia    Encounter for subsequent annual wellness visit (AWV) in Medicare patient    COPD with exacerbation    Chest pain    Dermatitis    Seasonal allergies    Familial hypercholesterolemia    Localized edema    Grief    Other constipation    Worried well    Screening mammogram for breast cancer    Heavy tobacco smoker    Screening for colon cancer    Fatigue    Adventitious breath sounds    Chest congestion    Prediabetes    Type 2 diabetes mellitus without complication, without long-term current use of insulin       Allergies   Allergen Reactions    Amoxicillin Rash    Clindamycin Hcl Unknown (See Comments)    Penicillin G Rash    Sulfa Antibiotics Rash       Social History     Socioeconomic History    Marital status: Single   Tobacco Use    Smoking status: Every Day     Current packs/day: 1.00     Average packs/day: 1 pack/day for 38.0 years (38.0 ttl pk-yrs)     Types: Cigarettes    Smokeless tobacco: Never   Vaping Use    Vaping status: Never Used   Substance and Sexual Activity     Alcohol use: No    Drug use: No    Sexual activity: Defer       Family History   Problem Relation Age of Onset    Lung cancer Father     Diabetes Father     Diabetes Paternal Grandfather        Review of Systems   Respiratory:  Negative for cough, shortness of breath and wheezing.    Cardiovascular:  Negative for chest pain, palpitations and leg swelling.   Gastrointestinal: Negative.    Endocrine: Negative for polydipsia, polyphagia and polyuria.   All other systems reviewed and are negative.          6/23/2025     1:54 PM   PHQ-2/PHQ-9 Depression Screening   Little interest or pleasure in doing things Not at all   Feeling down, depressed, or hopeless Not at all   How difficult have these problems made it for you to do your work, take care of things at home, or get along with other people? Not difficult at all       Fall Risk Screen:  ALFREDO Fall Risk Assessment was completed, and patient is at LOW risk for falls.Assessment completed on:6/23/2025    Objective       Current Outpatient Medications:     Bioflavonoid Products (Vitamin C Plus) 1000 MG tablet, Take 1 tablet by mouth Daily., Disp: , Rfl:     cetirizine (zyrTEC) 10 MG tablet, Take 1 tablet by mouth Daily., Disp: 30 tablet, Rfl: 11    hydroCHLOROthiazide 25 MG tablet, Take 1 tablet by mouth Daily., Disp: 90 tablet, Rfl: 3    linaclotide (LINZESS) 145 MCG capsule capsule, Take 1 capsule by mouth Every Morning Before Breakfast., Disp: 30 capsule, Rfl: 1    OLANZapine (zyPREXA) 15 MG tablet, Take 1 tablet by mouth Every 12 (Twelve) Hours., Disp: , Rfl:     propranolol (INDERAL) 20 MG tablet, PROPRANOLOL HCL 20 MG TABS, Disp: , Rfl:     simvastatin (ZOCOR) 20 MG tablet, Take 1 tablet by mouth every night at bedtime., Disp: 90 tablet, Rfl: 3    topiramate (TOPAMAX) 200 MG tablet, TOPAMAX 200 MG TABS, Disp: , Rfl:     Blood Glucose Monitoring Suppl (True Metrix Go Glucose Meter) w/Device kit, Use 1 each Daily., Disp: 1 kit, Rfl: 0    glucose blood (True Metrix  "Blood Glucose Test) test strip, 1 each by Other route Daily. Use as instructed, Disp: 100 each, Rfl: 3    metFORMIN ER (GLUCOPHAGE-XR) 500 MG 24 hr tablet, Take 1 tablet by mouth Daily With Breakfast., Disp: 180 tablet, Rfl: 0    Vitals:    07/01/25 1440   BP: 108/70   Pulse: 76   Resp: 18   Temp: 97.5 °F (36.4 °C)   TempSrc: Temporal   SpO2: 93%   Weight: 88.4 kg (194 lb 12.8 oz)   Height: 160 cm (62.99\")                  Physical Exam  Vitals and nursing note reviewed.   Constitutional:       General: She is not in acute distress.     Appearance: Normal appearance. She is well-groomed. She is not ill-appearing.   HENT:      Head: Normocephalic and atraumatic.   Eyes:      General: Lids are normal. Vision grossly intact.   Neck:      Vascular: No carotid bruit.   Cardiovascular:      Rate and Rhythm: Normal rate and regular rhythm.      Heart sounds: Normal heart sounds.   Pulmonary:      Effort: Pulmonary effort is normal.      Breath sounds: Normal breath sounds and air entry.   Musculoskeletal:      Right lower leg: No edema.      Left lower leg: No edema.   Skin:     General: Skin is warm and dry.   Neurological:      Mental Status: She is alert and oriented to person, place, and time. Mental status is at baseline.   Psychiatric:         Mood and Affect: Mood normal.         Behavior: Behavior normal. Behavior is cooperative.       Derm Physical Exam  Physical Exam      Result Review :     The PHQ has not been completed during this encounter.       Results  Labs   - A1c: 6.6   - A1c: 05/28/2024, 6.4    Labs:       Imaging:   No valid procedures specified.        Data reviewed:             Plan      Assessment & Plan  New onset type 2 diabetes mellitus      Orders:    Microalbumin / Creatinine Urine Ratio - Urine, Clean Catch      Assessment & Plan  1. Diabetes mellitus.  - A1c level is currently at 6.6, indicating a diagnosis of diabetes.  - Comprehensive discussion regarding potential complications including " cardiovascular issues, ocular problems, neuropathy, and delayed wound healing.  - Advised to adopt a controlled carbohydrate diet, limit intake of sweets and simple sugars, avoid sodas, and introduced the concept of glycemic index foods.  - Prescription for metformin 500 mg daily with breakfast provided; glucometer will be provided for home monitoring of blood glucose levels; encouraged to attend diabetic education sessions at the [facility] in [place].    Follow-up  The patient will follow up in 3 months.       Follow Up   Wrapup Tab  Return in about 3 months (around 10/1/2025) for Recheck.     There are no Patient Instructions on file for this visit.  Patient was given instructions and counseling regarding her condition or for health maintenance advice. Please see specific information pulled into the AVS if appropriate.  Hand hygiene was performed during entrance to exam room and following assessment of patient. This document is intended for medical expert use only.     EMR Dragon/Transcription disclaimer:   Much of this encounter note is an electronic transcription/translation of spoken language to printed text. The electronic translation of spoken language may permit erroneous, or at times, nonsensical words or phrases to be inadvertently transcribed.        ITERRA Mckeon, FNP-C  [address]  [facility]  [address] [place] [address] [address]  [facility] [id number] [phone]    Patient or patient representative verbalized consent for the use of Ambient Listening during the visit with  TIERRA Mckeon for chart documentation. 7/1/2025  15:22 EDT

## 2025-07-01 ENCOUNTER — OFFICE VISIT (OUTPATIENT)
Dept: FAMILY MEDICINE CLINIC | Facility: CLINIC | Age: 60
End: 2025-07-01
Payer: MEDICARE

## 2025-07-01 VITALS
SYSTOLIC BLOOD PRESSURE: 108 MMHG | BODY MASS INDEX: 34.52 KG/M2 | OXYGEN SATURATION: 93 % | TEMPERATURE: 97.5 F | DIASTOLIC BLOOD PRESSURE: 70 MMHG | HEART RATE: 76 BPM | HEIGHT: 63 IN | RESPIRATION RATE: 18 BRPM | WEIGHT: 194.8 LBS

## 2025-07-01 DIAGNOSIS — E11.9 NEW ONSET TYPE 2 DIABETES MELLITUS: Primary | ICD-10-CM

## 2025-07-01 RX ORDER — METFORMIN HYDROCHLORIDE 500 MG/1
500 TABLET, EXTENDED RELEASE ORAL
Qty: 180 TABLET | Refills: 0 | Status: SHIPPED | OUTPATIENT
Start: 2025-07-01

## 2025-07-01 RX ORDER — CALCIUM CITRATE/VITAMIN D3 200MG-6.25
1 TABLET ORAL DAILY
Qty: 100 EACH | Refills: 3 | Status: SHIPPED | OUTPATIENT
Start: 2025-07-01

## 2025-07-01 RX ORDER — BLOOD-GLUCOSE METER
1 EACH MISCELLANEOUS DAILY
Qty: 1 KIT | Refills: 0 | Status: SHIPPED | OUTPATIENT
Start: 2025-07-01

## 2025-07-01 NOTE — ASSESSMENT & PLAN NOTE
{Diabetes (Optional):[id number]}    Orders:    Microalbumin / Creatinine Urine Ratio - Urine, Clean Catch